# Patient Record
Sex: MALE | Race: WHITE
[De-identification: names, ages, dates, MRNs, and addresses within clinical notes are randomized per-mention and may not be internally consistent; named-entity substitution may affect disease eponyms.]

---

## 2019-07-10 ENCOUNTER — HOSPITAL ENCOUNTER (INPATIENT)
Dept: HOSPITAL 75 - ER | Age: 39
LOS: 2 days | Discharge: HOME | DRG: 854 | End: 2019-07-12
Attending: INTERNAL MEDICINE | Admitting: INTERNAL MEDICINE
Payer: SELF-PAY

## 2019-07-10 VITALS — SYSTOLIC BLOOD PRESSURE: 123 MMHG | DIASTOLIC BLOOD PRESSURE: 77 MMHG

## 2019-07-10 VITALS — SYSTOLIC BLOOD PRESSURE: 117 MMHG | DIASTOLIC BLOOD PRESSURE: 60 MMHG

## 2019-07-10 VITALS — WEIGHT: 240 LBS | BODY MASS INDEX: 29.84 KG/M2 | HEIGHT: 75 IN

## 2019-07-10 VITALS — SYSTOLIC BLOOD PRESSURE: 113 MMHG | DIASTOLIC BLOOD PRESSURE: 64 MMHG

## 2019-07-10 DIAGNOSIS — M79.7: ICD-10-CM

## 2019-07-10 DIAGNOSIS — Z23: ICD-10-CM

## 2019-07-10 DIAGNOSIS — F17.210: ICD-10-CM

## 2019-07-10 DIAGNOSIS — A41.02: Primary | ICD-10-CM

## 2019-07-10 DIAGNOSIS — L03.113: ICD-10-CM

## 2019-07-10 LAB
ALBUMIN SERPL-MCNC: 4.5 GM/DL (ref 3.2–4.5)
ALP SERPL-CCNC: 92 U/L (ref 40–136)
ALT SERPL-CCNC: 43 U/L (ref 0–55)
APTT BLD: 33 SEC (ref 24–35)
BASOPHILS # BLD AUTO: 0.1 10^3/UL (ref 0–0.1)
BASOPHILS NFR BLD AUTO: 0 % (ref 0–10)
BASOPHILS NFR BLD MANUAL: 2 %
BILIRUB SERPL-MCNC: 0.5 MG/DL (ref 0.1–1)
BUN/CREAT SERPL: 17
CALCIUM SERPL-MCNC: 9.9 MG/DL (ref 8.5–10.1)
CHLORIDE SERPL-SCNC: 101 MMOL/L (ref 98–107)
CO2 SERPL-SCNC: 24 MMOL/L (ref 21–32)
CREAT SERPL-MCNC: 1.12 MG/DL (ref 0.6–1.3)
EOSINOPHIL # BLD AUTO: 0.2 10^3/UL (ref 0–0.3)
EOSINOPHIL NFR BLD AUTO: 1 % (ref 0–10)
EOSINOPHIL NFR BLD MANUAL: 2 %
ERYTHROCYTE [DISTWIDTH] IN BLOOD BY AUTOMATED COUNT: 13.6 % (ref 10–14.5)
GFR SERPLBLD BASED ON 1.73 SQ M-ARVRAT: > 60 ML/MIN
GLUCOSE SERPL-MCNC: 99 MG/DL (ref 70–105)
HCT VFR BLD CALC: 42 % (ref 40–54)
HGB BLD-MCNC: 14 G/DL (ref 13.3–17.7)
INR PPP: 1 (ref 0.8–1.4)
LYMPHOCYTES # BLD AUTO: 4 X 10^3 (ref 1–4)
LYMPHOCYTES NFR BLD AUTO: 27 % (ref 12–44)
MANUAL DIFFERENTIAL PERFORMED BLD QL: YES
MCH RBC QN AUTO: 30 PG (ref 25–34)
MCHC RBC AUTO-ENTMCNC: 34 G/DL (ref 32–36)
MCV RBC AUTO: 88 FL (ref 80–99)
MONOCYTES # BLD AUTO: 1.5 X 10^3 (ref 0–1)
MONOCYTES NFR BLD AUTO: 10 % (ref 0–12)
MONOCYTES NFR BLD: 7 %
NEUTROPHILS # BLD AUTO: 9.1 X 10^3 (ref 1.8–7.8)
NEUTROPHILS NFR BLD AUTO: 61 % (ref 42–75)
NEUTS BAND NFR BLD MANUAL: 45 %
NEUTS BAND NFR BLD: 1 %
NEUTS HYPERSEG BLD QL SMEAR: SLIGHT
OVALOCYTES BLD QL SMEAR: SLIGHT
PLATELET # BLD: 357 10^3/UL (ref 130–400)
PMV BLD AUTO: 9.2 FL (ref 7.4–10.4)
POIKILOCYTOSIS BLD QL SMEAR: SLIGHT
POTASSIUM SERPL-SCNC: 4.2 MMOL/L (ref 3.6–5)
PROT SERPL-MCNC: 8 GM/DL (ref 6.4–8.2)
PROTHROMBIN TIME: 13.6 SEC (ref 12.2–14.7)
SODIUM SERPL-SCNC: 137 MMOL/L (ref 135–145)
STOMATOCYTES BLD QL SMEAR: SLIGHT
TOXIC GRANULES BLD QL SMEAR: (no result)
VARIANT LYMPHS NFR BLD MANUAL: 39 %
VARIANT LYMPHS NFR BLD MANUAL: 4 %
WBC # BLD AUTO: 14.8 10^3/UL (ref 4.3–11)

## 2019-07-10 PROCEDURE — 85730 THROMBOPLASTIN TIME PARTIAL: CPT

## 2019-07-10 PROCEDURE — 80053 COMPREHEN METABOLIC PANEL: CPT

## 2019-07-10 PROCEDURE — 87075 CULTR BACTERIA EXCEPT BLOOD: CPT

## 2019-07-10 PROCEDURE — 87081 CULTURE SCREEN ONLY: CPT

## 2019-07-10 PROCEDURE — 87040 BLOOD CULTURE FOR BACTERIA: CPT

## 2019-07-10 PROCEDURE — 87186 SC STD MICRODIL/AGAR DIL: CPT

## 2019-07-10 PROCEDURE — 85027 COMPLETE CBC AUTOMATED: CPT

## 2019-07-10 PROCEDURE — 85007 BL SMEAR W/DIFF WBC COUNT: CPT

## 2019-07-10 PROCEDURE — 73080 X-RAY EXAM OF ELBOW: CPT

## 2019-07-10 PROCEDURE — 87205 SMEAR GRAM STAIN: CPT

## 2019-07-10 PROCEDURE — 96375 TX/PRO/DX INJ NEW DRUG ADDON: CPT

## 2019-07-10 PROCEDURE — 87070 CULTURE OTHR SPECIMN AEROBIC: CPT

## 2019-07-10 PROCEDURE — 80048 BASIC METABOLIC PNL TOTAL CA: CPT

## 2019-07-10 PROCEDURE — 83605 ASSAY OF LACTIC ACID: CPT

## 2019-07-10 PROCEDURE — 96365 THER/PROPH/DIAG IV INF INIT: CPT

## 2019-07-10 PROCEDURE — 80202 ASSAY OF VANCOMYCIN: CPT

## 2019-07-10 PROCEDURE — 90471 IMMUNIZATION ADMIN: CPT

## 2019-07-10 PROCEDURE — 96361 HYDRATE IV INFUSION ADD-ON: CPT

## 2019-07-10 PROCEDURE — 36415 COLL VENOUS BLD VENIPUNCTURE: CPT

## 2019-07-10 PROCEDURE — 85025 COMPLETE CBC W/AUTO DIFF WBC: CPT

## 2019-07-10 PROCEDURE — 90715 TDAP VACCINE 7 YRS/> IM: CPT

## 2019-07-10 PROCEDURE — 87077 CULTURE AEROBIC IDENTIFY: CPT

## 2019-07-10 PROCEDURE — 86141 C-REACTIVE PROTEIN HS: CPT

## 2019-07-10 PROCEDURE — 85610 PROTHROMBIN TIME: CPT

## 2019-07-10 PROCEDURE — 76999 ECHO EXAMINATION PROCEDURE: CPT

## 2019-07-10 NOTE — NUR
PATIENT STATES HE HAS BEEN TAKING AND OTC ASPIRIN 81MG DAILY. HE DOES NOT TAKE ANY 
PRESCRIPTION MEDICATION OR ANYTHING ELSE OTC.

## 2019-07-10 NOTE — XMS REPORT
[facility]

                             Created on: 2019



Kuldeep Skaggs

External Reference #: [id number]

: 1980

Sex: Male



Demographics







                          Address                   [address] [place]  [address]

 

                          Preferred Language        Unknown

 

                          Marital Status            Unknown

 

                          Druze Affiliation     Unknown

 

                          Race                      Unknown

 

                          Ethnic Group              Unknown





Author







                          Author                    Migration,  Doctor

 

                          Organization              [facility]

 

                          Address                   Unknown

 

                          Phone                     Unavailable







Care Team Providers







                    Care Team Member Name    Role                Phone

 

                    Migration,  Doctor    Unavailable         Unavailable







PROBLEMS







          Type      Condition    ICD9-CM Code    IXG68-HI Code    Onset Dates    Condition Status    SNOMED

 Code

 

          Problem    Pain in soft tissues of limb    729.5                         Active    [id number]

 

          Problem    Pain in joint, lower leg    719.46                        Active    [id number]

 

                Problem         Other, multiple and ill-defined closed fractures of lower limb    827.0           

                                        Active              [id number]







ALLERGIES







             Substance    Reaction     Event Type    Date         Status

 

             Demerol      Unknown      Drug Allergy        Active







ENCOUNTERS







                Encounter       Location        Date            Diagnosis

 

                          [facility]     [address] [place] [address]

                                         

 

                          [facility]     [id number] [place] [address]

                                         

 

                          [facility]     [id number] [place] [address]

                                         

 

                          [facility]     [id number] [place] [address]

                                         

 

                          [facility]     [id number] [place] [address]

                                         

 

                          [facility]     [id number] [place] [address]

                                         

 

                          [facility]     [id number] [place] [address]

                          10 Apr, 2014               

 

                          [facility]     [id number] [place] [address]

                          10 Apr, 2014               

 

                          [facility]     [address] [place] [address]

                                         

 

                          [facility]     [id number] [place] [address]

                                         

 

                          [facility] [place] [facility]     [address] [id number] [place] [address]

                                         

 

                          [facility] [place] [facility]     [address] [id number] [place] [address]

                                         

 

                          [facility] [place] [facility]     [address] [id number] [place] [address]

                          19 Mar, 2014               

 

                          [facility] [place] [facility]     [address] [id number] [place] [address]

                          19 Mar, 2014               

 

                          [facility] [place] [facility]     [address] [id number] [place] [address]

                          05 Dec, 2013               

 

                          [facility] [place] [facility]     [address] [id number] [place] [address]

                          05 Dec, 2013               

 

                          [facility] [place] [facility]     [address] [id number] [place] [address]

                          15 Nov, 2013               

 

                          [facility] [place] [facility]     [address] [id number] [place] [address]

                          15 Nov, 2013               

 

                          [facility] [place] [facility]     [address] [id number] [place] [address]

                          18 Oct, 2013               

 

                          [facility] [place] [facility]     [address] [id number] [place] [address]

                          18 Oct, 2013               

 

                          [facility] [place] [facility]     [address] [id number] [place] [address]

                          04 Oct, 2013               

 

                          [facility] [place] [facility]     [address] [id number] [place] [address]

                          03 Oct, 2013               

 

                          [facility] [place] [facility]     [address] [id number] [place] [address]

                          28 Aug, 2013               

 

                          [facility] [place] [facility]     [address] [id number] [place] [address]

                          26 Aug, 2013               

 

                          [facility] [place] [facility]     [address] [id number] [place] [address]

                                         

 

                          [facility] [place] [facility]     [address] [address]

                                         

 

                          [facility]     [address] [address]

                          20 May, 2013               

 

                          [facility]     [address] [address]

                          07 May, 2013               

 

                          [facility]     [address] [address]

                                         

 

                          [facility]     [address] [address]

                                         

 

                          [facility]     [address] [address]

                                         

 

                          [facility]     [address] [address]

                                         

 

                          [facility]     [address] [address]

                                         

 

                          [facility]     [address] [address]

                                         

 

                          [facility]     [id number] [address]

                                         







IMMUNIZATIONS

No Known Immunizations



SOCIAL HISTORY

Never Assessed



REASON FOR VISIT

[facility]



PLAN OF CARE





VITAL SIGNS





MEDICATIONS

Unknown Medications



RESULTS

No Results



PROCEDURES

No Known procedures



INSTRUCTIONS





MEDICATIONS ADMINISTERED

No Known Medications

## 2019-07-10 NOTE — DIAGNOSTIC IMAGING REPORT
INDICATION: Recent injury to the right elbow, now with redness

and swelling as well as fever.



Time of exam: 9:12 AM



3 views of the right elbow were obtained. There is moderate soft

tissue swelling about the posterior elbow. No soft tissue gas is

seen. No bony destructive changes are seen. No definite elbow

joint effusion is identified. No fracture or dislocation is seen.



IMPRESSION: Moderate posterior soft tissue swelling. No other

significant abnormality is seen.



Dictated by: 



  Dictated on workstation # [id number]

## 2019-07-10 NOTE — ED UPPER EXTREMITY
General


Chief Complaint:  Upper Extremity


Stated Complaint:  ELBOW INJ


Nursing Triage Note:  


PT AMB TO [facility] WITH COMPLAINT OF RIGHT ELBOW PAIN, SWELLING, AND REDNESS. 


STATES HE WAS BIT BY SOMETHING IN THE LAST FEW DAYS. STATES REDNESS HAS 


WORSENED. STATES LANCED AREA AT HOME, DID NOT SQUEEZE ANYTHING OUT.


Nursing Sepsis Screen:  No Definite Risk


Source:  patient


Exam Limitations:  no limitations





History of Present Illness


Date Seen by Provider:  Jul 10, 2019


Time Seen by Provider:  08:22


Initial Comments


Here with report of right elbow pain and swelling as well as redness that is 

increasing over the last 12 hours. He thinks he got bit by something on the 

injured elbow a few days ago. Noted swelling to the area. Tried to heidy it with

a needle last night and he did not get any purulent drainage. Redness increased.

He did luisa the wound edges. Redness does appear to be slightly outside of the 

border currently. Does have fever as tachycardic. He did have to ride a bike 

here today. States the redness and pain worsens during the bike ride. He has not

had anything for pain or fever today.


Onset:  other (2 days ago)


Severity:  moderate


Pain/Injury Location:  right arm, right elbow


Method of Injury:  unknown


Modifying Factors:  Improves With Immobilization; Worse With Movement; Improves 

With Rest





Allergies and Home Medications


Allergies


Coded Allergies:  


     meperidine (Unverified  Allergy, Unknown, PT HAS RECEIVED FENTANYL IN THE 

PAST W/O ISSUE, 2/16/15)





Home Medications


Naproxen 500 Mg Tablet, 500 MG PO BID, (Reported)


Oxycodone Hcl/Acetaminophen 1 Tab Tablet, 1-2 TAB PO Q4-6HR PRN for PAIN


   MAY TAKE 1 OR 2 TABS BY MOUTH EVERY 4 HRS AS NEEDED FOR PAIN. LAST DOSE, 2 

TABS, GIVEN AT 4:25 PM 2/16/15. DO NOT EXCEED 4000 MG TYLENOL IN A 24 HR PERIOD.




   Prescribed by: NICOLAS BRYAN on 2/16/15 6084





Patient Home Medication List


Home Medication List Reviewed:  Yes





Review of Systems


Constitutional:  see HPI; No chills, No fever


EENTM:  no symptoms reported


Respiratory:  no symptoms reported


Cardiovascular:  no symptoms reported


Gastrointestinal:  no symptoms reported


Musculoskeletal:  see HPI, joint pain, joint swelling, muscle pain


Skin:  change in color, lesions


Psychiatric/Neurological:  No Symptoms Reported





All Other Systems Reviewed


Negative Unless Noted:  Yes





Past Medical-Social-Family Hx


Past Med/Social Hx:  Reviewed Nursing Past Med/Soc Hx


Patient Social History


Alcohol Use:  Denies Use


Recreational Drug Use:  No


Smoking Status:  Current Everyday Smoker


Type Used:  Cigarettes


Recent Foreign Travel:  No


Contact w/Someone Who Travel:  No


Recent Infectious Disease Expo:  No





Past Medical History


Surgeries:  Yes


Appendectomy, Orthopedic


Respiratory:  No


Cardiac:  No


Neurological:  No


Reproductive Disorders:  No


Sexually Transmitted Disease:  No


Gastrointestinal:  No


Musculoskeletal:  Yes (RIGHT ANKLE)


Fibromyalgia


Endocrine:  No


Cancer:  No


Psychosocial:  No


Integumentary:  No


Blood Disorders:  No





Family Medical History


Reviewed Nursing Family Hx





Physical Exam


Vital Signs





Vital Signs - First Documented








 7/10/19





 08:09


 


Temp 100.2


 


Pulse 113


 


Resp 20


 


B/P (MAP) 140/91 (107)


 


Pulse Ox 97


 


O2 Delivery Room Air





Capillary Refill : Less Than 3 Seconds


Height, Weight, BMI


Height: 6'3.00"


Weight: 240lbs. oz. 108.624463fe;  BMI


Method:Stated


General Appearance:  WD/WN, no apparent distress


HEENT:  PERRL/EOMI, pharynx normal


Neck:  full range of motion, supple


Cardiovascular:  no murmur, tachycardia


Respiratory:  lungs clear, normal breath sounds, no respiratory distress


Gastrointestinal:  non tender, soft


Back:  normal inspection, no CVA tenderness, no vertebral tenderness


Shoulder:  normal inspection, normal ROM


Elbow/Forearm:  normal ROM, Right, pain (around the elbow both above and below 

where there is noted to be swelling and erythema), soft tissue tenderness, 

swelling (involving the right arm. Greatest around the right elbow. There is an 

indurated/tight spot to the elbow prominence with small wound centrally in the 

area that is 3-4 mm.)


Wrist:  Yes normal inspection, Yes no evidence of injury, Yes normal ROM


Hand:  normal inspection, non-tender, normal ROM, Bilateral


Neurologic/Psychiatric:  alert, oriented x 3


Skin:  warm/dry, other (erythema noted to right arm from mid biceps down to mid 

forearm circumferentially. Swelling noted in the same area.)





Progress/Results/Core Measures


Results/Orders


Lab Results





Laboratory Tests








Test


 7/10/19


08:49 Range/Units


 


 


White Blood Count


 14.8 H


 4.3-11.0


10^3/uL


 


Red Blood Count


 4.75 


 4.35-5.85


10^6/uL


 


Hemoglobin 14.0  13.3-17.7  G/DL


 


Hematocrit 42  40-54  %


 


Mean Corpuscular Volume 88  80-99  FL


 


Mean Corpuscular Hemoglobin 30  25-34  PG


 


Mean Corpuscular Hemoglobin


Concent 34 


 32-36  G/DL





 


Red Cell Distribution Width 13.6  10.0-14.5  %


 


Platelet Count


 357 


 130-400


10^3/uL


 


Mean Platelet Volume 9.2  7.4-10.4  FL


 


Neutrophils (%) (Auto) 61  42-75  %


 


Lymphocytes (%) (Auto) 27  12-44  %


 


Monocytes (%) (Auto) 10  0-12  %


 


Eosinophils (%) (Auto) 1  0-10  %


 


Basophils (%) (Auto) 0  0-10  %


 


Neutrophils # (Auto) 9.1 H 1.8-7.8  X 10^3


 


Lymphocytes # (Auto) 4.0  1.0-4.0  X 10^3


 


Monocytes # (Auto) 1.5 H 0.0-1.0  X 10^3


 


Eosinophils # (Auto)


 0.2 


 0.0-0.3


10^3/uL


 


Basophils # (Auto)


 0.1 


 0.0-0.1


10^3/uL


 


Neutrophils % (Manual) 45   %


 


Lymphocytes % (Manual) 39   %


 


Monocytes % (Manual) 7   %


 


Eosinophils % (Manual) 2   %


 


Basophils % (Manual) 2   %


 


Band Neutrophils 1   %


 


Hypersegmented Neutrophils SLIGHT   


 


Reactive Lymphocytes 4   %


 


Toxic Granulation 1+   


 


Poikilocytosis SLIGHT   


 


Stomatocytes SLIGHT   


 


Elliptocytes SLIGHT   


 


Prothrombin Time 13.6  12.2-14.7  SEC


 


INR Comment 1.0  0.8-1.4  


 


Activated Partial


Thromboplast Time 33 


 24-35  SEC





 


Sodium Level 137  135-145  MMOL/L


 


Potassium Level 4.2  3.6-5.0  MMOL/L


 


Chloride Level 101    MMOL/L


 


Carbon Dioxide Level 24  21-32  MMOL/L


 


Anion Gap 12  5-14  MMOL/L


 


Blood Urea Nitrogen 19 H 7-18  MG/DL


 


Creatinine


 1.12 


 0.60-1.30


MG/DL


 


Estimat Glomerular Filtration


Rate > 60 


  





 


BUN/Creatinine Ratio 17   


 


Glucose Level 99    MG/DL


 


Lactic Acid Level


 0.79 


 0.50-2.00


MMOL/L


 


Calcium Level 9.9  8.5-10.1  MG/DL


 


Corrected Calcium 9.5  8.5-10.1  MG/DL


 


Total Bilirubin 0.5  0.1-1.0  MG/DL


 


Aspartate Amino Transf


(AST/SGOT) 20 


 5-34  U/L





 


Alanine Aminotransferase


(ALT/SGPT) 43 


 0-55  U/L





 


Alkaline Phosphatase 92    U/L


 


C-Reactive Protein High


Sensitivity 10.33 H


 0.00-0.50


MG/DL


 


Total Protein 8.0  6.4-8.2  GM/DL


 


Albumin 4.5  3.2-4.5  GM/DL








My Orders





Orders - TIMO MCGUIRE MD


Cbc With Automated Diff (7/10/19 08:32)


Comprehensive Metabolic Panel (7/10/19 08:32)


Blood Culture (7/10/19 08:32)


Sputum Culture (7/10/19 08:32)


Protime With Inr (7/10/19 08:32)


Partial Thromboplastin Time (7/10/19 08:32)


Acetaminophen  Tablet (Tylenol  Tablet) (7/10/19 08:45)


Ed Iv/Invasive Line Start (7/10/19 08:32)


Vital Signs Adult Sepsis Patie Q15M (7/10/19 08:32)


O2 (7/10/19 08:32)


Remove Rings In Anticipation O (7/10/19 08:32)


Lactic Acid Analyzer (7/10/19 08:32)


Ns Iv 1000 Ml (Sodium Chloride 0.9%) (7/10/19 08:32)


Hs C Reactive Protein (7/10/19 08:32)


Elbow, Right, 3 Views (7/10/19 08:32)


Ketorolac Injection (Toradol Injection) (7/10/19 08:32)


Manual Differential (7/10/19 08:49)


Ceftriaxone  For Iv Use (Rocephin  For I (7/10/19 10:15)


Tdap (Boostrix) Im (7/10/19 10:30)





Medications Given in ED





Current Medications








 Medications  Dose


 Ordered  Sig/Ugo


 Route  Start Time


 Stop Time Status Last Admin


Dose Admin


 


 Acetaminophen  1,000 mg  ONCE  PRN


 PO  7/10/19 08:45


 7/10/19 08:56 DC 7/10/19 08:55


1,000 MG








Vital Signs/I&O











 7/10/19





 08:09


 


Temp 100.2


 


Pulse 113


 


Resp 20


 


B/P (MAP) 140/91 (107)


 


Pulse Ox 97


 


O2 Delivery Room Air














Blood Pressure Mean:                    107











Progress


Progress Note :  


Progress Note


Seen and evaluated. IV, labs, blood cultures and lactic acid ordered. Normal 

saline 1 L bolus, Toradol 30 mg IV and Tylenol 1 g by mouth. Patient does have 

findings for sepsis including tachycardia and febrile with concerning area of 

infection. Monitor patient. 1017: Labs reviewed. Patient noted to have 

cellulitis and findings of systemic inflammation with elevated CRP and white 

count. Rocephin 1 g IV ordered. Tetanus updated. I discussed the case with Dr. Gentile at 1015 and she accepts patient for admission. I did discuss the case 

with Dr. Wilson at 1017 and he accepts patient in consult. He requests 

ultrasound of the elbow which was ordered. We will initiate Rocephin and 

vancomycin orders. Discussed with patient and family who agree. Admit, inpatient

status.





Diagnostic Imaging





   Diagonstic Imaging:  Xray


   Plain Films/CT/US/NM/MRI:  elbow


Comments


                 [facility] [place], [place]





NAME:   KAILYN GANNON


[facility] REC#:   [id number]


ACCOUNT#:   [id number]


PT STATUS:   REG ER


:   1980


PHYSICIAN:   TIMO MCGUIRE MD


ADMIT DATE:   07/10/19/ER


                                   ***Draft***


Date of Exam:07/10/19





ELBOW, RIGHT, 3 VIEWS








INDICATION: Recent injury to the right elbow, now with redness


and swelling as well as fever.





Time of exam: 9:12 AM





3 views of the right elbow were obtained. There is moderate soft


tissue swelling about the posterior elbow. No soft tissue gas is


seen. No bony destructive changes are seen. No definite elbow


joint effusion is identified. No fracture or dislocation is seen.





IMPRESSION: Moderate posterior soft tissue swelling. No other


significant abnormality is seen.





  Dictated on workstation # [id number]








Dict:   07/10/19 0916


Trans:   07/10/19 0921


RADHA [id number]





Interpreted by:     KENDALL SANCHEZ MD


Electronically signed by:





Departure


Communication (Admissions)


Time/Spoke to Admitting Phy:  10:15


Time/Spoke to Consulting Phy:  10:17





Impression





   Primary Impression:  


   Right arm cellulitis


Disposition:   ADMITTED AS INPATIENT


Condition:  Stable





Admissions


Decision to Admit Reason:  Admit from ER (General)


Decision to Admit/Date:  Jul 10, 2019


Time/Decision to Admit Time:  10:15











TIMO MCGUIRE MD          Jul 10, 2019 08:45

## 2019-07-10 NOTE — XMS REPORT
Continuity of Care Document

                             Created on: 07/10/2019



KAILYN GANNON

External Reference #: [id number]

: 1980

Sex: Male



Demographics







                          Address                   [address] [place]  [address]

 

                          Home Phone                [phone] x

 

                          Preferred Language        Unknown

 

                          Marital Status            Unknown

 

                          Jew Affiliation     Unknown

 

                          Race                      Unknown

 

                          Ethnic Group              Unknown





Author







                          Organization              Unknown

 

                          Address                   Unknown

 

                          Phone                     [phone]



              



Allergies

      





             Active              Description              Code              Type              Severity

                Reaction              Onset              Reported/Identified              Relationship

 to Patient                             Clinical Status        

 

             Yes              Demerol                             Drug Allergy                       

                                           2013                                      

 

             Yes              Demerol                             Drug Allergy              N/A      

             N/A                             2013                                      

 

                Yes              meperidine              [id number]              Drug Allergy         

             Unknown              N/A                             2014                       

                                                 

 

                Yes              meperidine              [id number]              Drug Allergy         

                Unknown              PT HAS RECEIVED                              2015         

                                                             



                        



Medications

      



There is no data.                  



Problems

      





             Date Dx Coded              Attending              Type              Code              Diagnosis

                                        Diagnosed By        

 

             2011                                            316              [employer]

 WITH DISEASES CLASSIFIED ELSEWHERE                       

 

             2011                                            316              [employer]

 WITH DISEASES CLASSIFIED ELSEWHERE                       

 

             2011                                            316              [employer]

 WITH DISEASES CLASSIFIED ELSEWHERE                       

 

             2011              CHEYENNE LOWERY DO                             316              [employer]

 [employer] WITH DISEASES CLASSIFIED ELSEWHERE                       

 

                2011              NAHID DOTY MD                              316           

                          [employer] WITH DISEASES CLASSIFIED ELSEWHERE                     

  

 

             2011              CHEYENNE LOWERY DO                             316              [employer]

 [employer] WITH DISEASES CLASSIFIED ELSEWHERE                       

 

             2011                                            316              [employer]

 WITH DISEASES CLASSIFIED ELSEWHERE                       

 

             2011              RONNY EISENBERG MD                             316              [employer]

 [employer] WITH DISEASES CLASSIFIED ELSEWHERE                       

 

             2013                                            719.46              PAIN IN JOINT 

INVOLVING LOWER LEG                              

 

             2013                                            719.46              joint pain in 

the left knee                                    

 

             2013                                            719.46              joint pain in 

the left knee                                    

 

                2013              CHEYENNE LOWERY DO                              719.46           

                          joint pain in the left knee                       

 

                2013              NAHID DOTY MD                              719.46        

                          joint pain in the left knee                       

 

                2013              CHEYENNE LOWERY DO                              719.46           

                          joint pain in the left knee                       

 

                2013              RONNY EISENBERG MD                              719.46          

                          joint pain in the left knee                       

 

             04/10/2014              CHEYENNE LOWERY DO                             729.5              

PAIN IN LIMB                                     

 

                04/10/2014              RONNY EISENBERG MD                              729.5           

                          PAIN IN LIMB                       

 

             2014              IVORY MCQUEEN DO              Ot              844.9              

                                                 

 

             2014              IVORY MCQUEEN DO              Ot              959.7              

                                                 

 

                2014              IVORY MCQUEEN DO              Ot              E000.8           

                                                             

 

                2014              IVORY MCQUEEN DO              Ot              E849.0           

                                                             

 

                2014              IVORY MCQUEEN DO              Ot              E927.0           

                                                             

 

                2015              FAINA TAYLOR, RONNY                              827.0           

                          OTHER MULTIPLE AND ILL-DEFINED FRACTURES OF LOWER LIMB CLOSED                     

  

 

           2015                             Ot              724.5                              

        

 

                2015              TORRI CHUA MD              Ot              719.46   

                                                             

 

                2015              TORRI CHUA MD              Ot              719.46   

                                                             

 

                2015              JOSEMANUEL TAYLOR, ЕЛЕНА LIZAMA              Ot              823.01         

                                                             

 

                2015              ЕЛЕНА ABERNATHY MD              Ot              824.0          

                                                             

 

                2015              ЕЛЕНА ABERNATHY MD              Ot              845.00         

                                                             

 

                2015              ЕЛЕНА ABERNATHY MD              Ot              E000.8         

                                                             

 

                2015              ЕЛЕНА ABERNATHY MD              Ot              E849.6         

                                                             

 

                2015              ЕЛЕНА ABERNATHY MD              Ot              E888.9         

                                                             



                                                                                



Procedures

      





                Code              Description              Performed By              Performed On     

   

 

                                      80031                                    XRAY KNEE LEFT, 1 OR 2 VIEWS 

                                                    2013        

 

                                      ORTHO                                    THANH GIBSON                  

                                                    2013        

 

                                      [id number]                                    ROUTINE VENIPUNCTURE         

                                                    2013        

 

                                      13877                                    MRI EXTREMITY JOINT, LOWER LEFT,

 W/O CONTRAST                                                  2013        

 

                                      11269                                    US LOWER EXTREMITY ULTRASOUND

                                                    2013        

 

                                21937                                    CMP                            

                                        2013        

 

                                [id number]                                    CBC                            

                                        2013        

 

                                      Orthopedi                                    Thanh Gibson              

                                                    2013        

 

                                      51762                                    JOINT INJECTION- LARGE JOINT 

(SPECIFY MEDCIN DESCRIPTION)                                                  10/03/2013        



 

                                      86085                                    XRAY FOOT RIGHT COMP MIN 3 VIEWS

                                                    04/10/2014        

 

                                      78872                                    XRAY TIBIA/FIBULA RIGHT      

                                                    2015        



                                      



Results

      



There is no data.              



Encounters

      





                ACCT No.              Visit Date/Time              Discharge              Status      

                Pt. Type              Provider              Facility              Loc./Unit      

                                        Complaint        

 

                [id number]              2015 11:47:00              2015 23:59:59              

[facility]              Outpatient              RONNY EISENBERG MD                                

                                                 

 

                [id number]              04/10/2014 13:42:00              04/10/2014 23:59:59              

[facility]              Outpatient              BEVERLEY GRANADO CHEYENNE EVERTON                                 

                                                 

 

                [id number]              2014 10:52:00              2014 23:59:59              

[facility]              Outpatient              NAHID DOTY MD                              

                                                 

 

                [id number]              10/03/2013 15:12:00              10/03/2013 23:59:59              

[facility]              Outpatient              CHEYENNE LOWERY DO                                 

                                                 

 

                [id number]              2013 09:08:00              2013 23:59:59              

[facility]              Outpatient                                                                

    

 

                [id number]              2012 12:30:00              2012 23:59:59              [facility]

              Outpatient                                                                   

 

 

             [id number]              2013 14:29:00                                            Document

 Registration                                                                       

 

             [id number]              2013 15:16:00                                            Document

 Registration                                                                       

 

                    [id number]              2015 09:30:00              2015 17:50:00      

                DIS              Outpatient              ЕЛЕНА ABERNATHY MD              [facility]                                

 

                    [id number]              2015 13:35:00              2015 23:59:59      

                [facility]              Outpatient              ЕЛЕНА ABERNATHY MD              [facility]              PREOP                              

 

                    [id number]              2014 17:23:00              2014 18:35:00      

                DIS              Emergency              IVORY MCQUEEN DO              [facility]              ER                                 

 

                    [id number]              2013 11:58:00              2013 23:59:59      

                [facility]              Outpatient              TORRI CHUA MD              [facility]              RAD                                

 

                    [id number]              2013 09:51:00              2013 23:59:59      

                [facility]              Outpatient              TORRI CHUA MD              [facility]              RAD                                

 

                [id number]              2010 12:46:00                                          

             Document Registration

## 2019-07-10 NOTE — NUR
KAILYN GANNON admitted to room 410-1, with an admitting diagnosis of RIGHT ARM CELLULITIS, 
on 07/10/19 from ED via W/C, accompanied by ED STAFF.  KAILYN GANNON introduced to 
surroundings, call light, bed controls, phone, TV, temperature control, lights, meal times, 
smoking policy, visitor policy, side rail policy, bathrooms and showers.  Patient Rights 
given to patient in the handbook.  KAILYN GANNON verbalizes understanding that [facility] 
is not responsible for the loss or damage to any personal effects or valuables that are kept 
in the patients possession during their hospitalization.

## 2019-07-10 NOTE — XMS REPORT
[facility]

                             Created on: 2019



Kuldeep Skaggs

External Reference #: [id number]

: 1980

Sex: Male



Demographics







                          Address                   [address] [place]  [address]

 

                          Preferred Language        Unknown

 

                          Marital Status            Unknown

 

                          Orthodoxy Affiliation     Unknown

 

                          Race                      Unknown

 

                          Ethnic Group              Unknown





Author







                          Author                    Migration,  Doctor

 

                          Organization              [facility]

 

                          Address                   Unknown

 

                          Phone                     Unavailable







Care Team Providers







                    Care Team Member Name    Role                Phone

 

                    Migration,  Doctor    Unavailable         Unavailable







PROBLEMS







          Type      Condition    ICD9-CM Code    JVN08-ZN Code    Onset Dates    Condition Status    SNOMED

 Code

 

          Problem    Pain in soft tissues of limb    729.5                         Active    [id number]

 

          Problem    Pain in joint, lower leg    719.46                        Active    [id number]

 

                Problem         Other, multiple and ill-defined closed fractures of lower limb    827.0           

                                        Active              [id number]







ALLERGIES

No Information



ENCOUNTERS







                Encounter       Location        Date            Diagnosis

 

                          [facility]     [address] [place] [address]

                                         

 

                          [facility]     [id number] [place] [address]

                                         

 

                          [facility]     [id number] [place] [address]

                                         

 

                          [facility]     [id number] [place] [address]

                                         

 

                          [facility]     [id number] [place] [address]

                                         

 

                          [facility]     [address] [place] [address]

                                         

 

                          [facility]     [id number] [place] [address]

                          10 Apr, 2014               

 

                          [facility]     [id number] [place] [address]

                          10 Apr, 2014               

 

                          [facility]     [id number] [place] [address]

                                         

 

                          [facility]     [address] [place] [address]

                                         

 

                          [facility] [place] [facility]     [address] [id number] [place] [address]

                                         

 

                          [facility] [place] [facility]     [address] [id number] [place] [address]

                                         

 

                          [facility] [place] [facility]     [address] [id number] [place] [address]

                          19 Mar, 2014               

 

                          [facility] [place] [facility]     [address] [id number] [place] [address]

                          19 Mar, 2014               

 

                          [facility] [place] [facility]     [address] [id number] [place] [address]

                          05 Dec, 2013               

 

                          [facility] [place] [facility]     [address] [id number] [place] [address]

                          05 Dec, 2013               

 

                          [facility] [place] [facility]     [address] [id number] [place] [address]

                          15 Nov, 2013               

 

                          [facility] [place] [facility]     [address] [id number] [place] [address]

                          15 Nov, 2013               

 

                          [facility] [place] [facility]     [address] [id number] [place] [address]

                          18 Oct, 2013               

 

                          [facility] [place] [facility]     [address] [id number] [place] [address]

                          18 Oct, 2013               

 

                          [facility] [place] [facility]     [address] [id number] [place] [address]

                          04 Oct, 2013               

 

                          [facility] [place] [facility]     [address] [id number] [place] [address]

                          03 Oct, 2013               

 

                          [facility] [place] [facility]     [address] [id number] [place] [address]

                          28 Aug, 2013               

 

                          [facility] [place] [facility]     [address] [id number] [place] [address]

                          26 Aug, 2013               

 

                          [facility] [place] [facility]     [address] [id number] [place] [address]

                                         

 

                          [facility] [place] [facility]     [address] [id number] [address]

                                         

 

                          [facility]     [id number] [address]

                          20 May, 2013               

 

                          [facility]     [address] [address]

                          07 May, 2013               

 

                          [facility]     [address] [address]

                                         

 

                          [facility]     [address] [place] [address]

                                         

 

                          [facility]     [id number] [address]

                                         

 

                          [facility]     [address] [place] [address]

                                         

 

                          [facility]     [id number] [address]

                                         

 

                          [facility]     [id number] [address]

                                         

 

                          [facility]     [id number] [address]

                                         







IMMUNIZATIONS

No Known Immunizations



SOCIAL HISTORY

Never Assessed



RE[place] VISIT

[facility] [place]





VITAL SIGNS





MEDICATIONS

Unknown Medications



RESULTS

No Results



PROCEDURES

No Known procedures



INSTRUCTIONS





MEDICATIONS ADMINISTERED

No Known Medications

## 2019-07-10 NOTE — CONSULTATION - SURGERY
History of Present Illness


History of Present Illness


Patient Consulted On(sarahy/time)


7/10/19


 16:57


Time Seen by Provider:  14:36


History of Present Illness


Surgery asked to consult regarding Right Upper extremity cellulitis possible 

abscess (Elbow)





HPI per ED:  Here with report of right elbow pain and swelling as well as 

redness that is increasing over the last 12 hours. He thinks he got bit by 

something on the injured elbow a few days ago. Noted swelling to the area. Tried

to heidy it with a needle last night and he did not get any purulent drainage. 

Redness increased. He did luisa the wound edges. Redness does appear to be 

slightly outside of the border currently. Does have fever as tachycardic. He did

have to ride a bike here today. States the redness and pain worsens during the 

bike ride. He has not had anything for pain or fever today.


Onset:  other (2 days ago)


Severity:  moderate


Pain/Injury Location:  right arm, right elbow


Method of Injury:  unknown


Modifying Factors:  Improves With Immobilization; Worse With Movement; Improves 

With Rest








When I spoke to pt he was getting his US and he thought the swelling and pain 

were a little better.  He rated the pain as 6 out of 10, described as dull, 

achey and constant.  His girlfriend states she has had 2 episodes like this and 

she was told she had MRSA.





Allergies and Home Medications


Allergies


Coded Allergies:  


     meperidine (Unverified  Allergy, Unknown, PT HAS RECEIVED FENTANYL IN THE 

PAST W/O ISSUE, 2/16/15)





Home Medications


Aspirin 81 Mg Tablet.dr, 81 MG PO DAILY, (Reported)





Patient Home Medication List


Home Medication List Reviewed:  Yes





Past Medical-Social-Family Hx


Patient Social History


Alcohol Use:  Denies Use


Recreational Drug Use:  No


Smoking Status:  Current Everyday Smoker


Type Used:  Cigarettes


Recent Foreign Travel:  No


Contact w/Someone Who Travel:  No


Recent Infectious Disease Expo:  No





Surgeries


History of Surgeries:  Yes


Surgeries:  Appendectomy, Orthopedic





Respiratory


History of Respiratory Disorde:  No





Cardiovascular


History of Cardiac Disorders:  No





Neurological


History of Neurological Disord:  No





Reproductive System


Hx Reproductive Disorders:  No


Sexually Transmitted Disease:  No





Gastrointestinal


History of Gastrointestinal Di:  No





Musculoskeletal


History of Musculoskeletal Dis:  Yes (RIGHT ANKLE)


Musculoskeletal Disorders:  Fibromyalgia





Endocrine


History of Endocrine Disorders:  No





Cancer


History of Cancer:  No





Psychosocial


History of Psychiatric Problem:  No





Integumentary


History of Skin or Integumenta:  No





Blood Transfusions


History of Blood Disorders:  No





Family Medical History


Significant Family History:  Other Conditions/Hx (Pt states his parents have no 

medical problems; denied DM, HTN or CAD)





Review of Systems-General


Constitutional:  chills; No diaphoresis, No weight loss


EENTM:  No blurred vision, No double vision, No mouth pain, No mouth swelling, 

No nose congestion


Respiratory:  No cough, No dyspnea on exertion


Cardiovascular:  No chest pain, No edema, No palpitations


Gastrointestinal:  No abdominal pain, No nausea, No vomiting


Genitourinary:  No dysuria, No frequency, No hematuria


Musculoskeletal:  joint pain, joint swelling, muscle pain, muscle stiffness, 

muscle cramps


Skin:  see HPI


Psychiatric/Neurological:  Denies Anxiety, Denies Depressed, Denies Seizure, 

Denies Tremors


Other


pt denies hx or abnromal bleeding or bruising





Physical Exam-General Problems


Physical Exam


Vital Signs





Vital Signs - First Documented








 7/10/19





 08:09


 


Temp 100.2


 


Pulse 113


 


Resp 20


 


B/P (MAP) 140/91 (107)


 


Pulse Ox 97


 


O2 Delivery Room Air





Capillary Refill : Less Than 3 Seconds


General Appearance:  WD/WN, mild distress


Eyes:  Bilateral Eye PERRL, Bilateral Eye EOMI


HEENT:  pharynx normal; No scleral icterus (R), No scleral icterus (L)


Neck:  non-tender, full range of motion, supple, normal inspection


Respiratory:  chest non-tender, lungs clear, normal breath sounds, no 

respiratory distress, no accessory muscle use


Cardiovascular:  regular rate, rhythm, no edema, no gallop, no JVD, no murmur


Gastrointestinal:  normal bowel sounds, non tender, soft, no organomegaly, no 

pulsatile mass


Back:  no CVA tenderness, no vertebral tenderness


Extremities:  no pedal edema, no calf tenderness, normal capillary refill, other

(right arm is swollen from about mid-bicep down to hand, mildly erythematous in 

this area, appx 3-4 cm area of brighter red around the elbow, tender to touch 

and warm)


Neurologic/Psychiatric:  CNs II-XII nml as tested, no motor/sensory deficits, 

alert, normal mood/affect, oriented x 3


Skin:  normal color, warm/dry


Lymphatic:  no adenopathy (neck, axilla or groin)





Data Review


Labs


Laboratory Tests


7/10/19 08:49: 


White Blood Count 14.8H, Red Blood Count 4.75, Hemoglobin 14.0, Hematocrit 42, 

Mean Corpuscular Volume 88, Mean Corpuscular Hemoglobin 30, Mean Corpuscular 

Hemoglobin Concent 34, Red Cell Distribution Width 13.6, Platelet Count 357, 

Mean Platelet Volume 9.2, Neutrophils (%) (Auto) 61, Lymphocytes (%) (Auto) 27, 

Monocytes (%) (Auto) 10, Eosinophils (%) (Auto) 1, Basophils (%) (Auto) 0, 

Neutrophils # (Auto) 9.1H, Lymphocytes # (Auto) 4.0, Monocytes # (Auto) 1.5H, 

Eosinophils # (Auto) 0.2, Basophils # (Auto) 0.1, Neutrophils % (Manual) 45, 

Lymphocytes % (Manual) 39, Monocytes % (Manual) 7, Eosinophils % (Manual) 2, 

Basophils % (Manual) 2, Band Neutrophils 1, Hypersegmented Neutrophils SLIGHT, 

Reactive Lymphocytes 4, Toxic Granulation 1+, Poikilocytosis SLIGHT, 

Stomatocytes SLIGHT, Elliptocytes SLIGHT, Prothrombin Time 13.6, INR Comment 

1.0, Activated Partial Thromboplast Time 33, Sodium Level 137, Potassium Level 

4.2, Chloride Level 101, Carbon Dioxide Level 24, Anion Gap 12, Blood Urea 

Nitrogen 19H, Creatinine 1.12, Estimat Glomerular Filtration Rate > 60, 

BUN/Creatinine Ratio 17, Glucose Level 99, Lactic Acid Level 0.79, Calcium Level

9.9, Corrected Calcium 9.5, Total Bilirubin 0.5, Aspartate Amino Transf (AST/SG

OT) 20, Alanine Aminotransferase (ALT/SGPT) 43, Alkaline Phosphatase 92, C-

Reactive Protein High Sensitivity 10.33H, Total Protein 8.0, Albumin 4.5





Assessment/Plan


Right Arm Cellulitis and Abscess at Elbow





I was there at bedside when US tech looked for abscess; found an appx 1cm colle

ction of fluid about 1cm deep.  Surrounding this area was a lot of 

edema/cellulitis seen with US.  Plan is NPO after midnight, IV ABX,


pain control and will go to the OR for I&D with possible packing of right arm 

abscess.  Will also order consent for the procedure.  I discussed risks and 

benefits of procedure with the pt; including but not limited to pain,


bleeding, infection, scar and need for further procedure.  All questions 

answered to his satisfaction.  I did give him option of trying this at bedside, 

but he didn't want to risk too much pain.





Clinical Quality Measures


DVT/VTE Risk/Contraindication:


Risk Factor Score Per Nursing:  3


RFS Level Per Nursing on Admit:  3=High











AUGUST HOPE DO               Jul 10, 2019 17:03

## 2019-07-10 NOTE — DIAGNOSTIC IMAGING REPORT
INDICATION: Right arm cellulitis and right elbow swelling.



FINDINGS: Sonographic interrogation of the area of swelling in

the right arm was performed. There is edema identified in the

subcutaneous tissues. There is a small complex fluid collection

just deep to the skin wound measuring 2.2 x 0.6 x 1.1 cm. No

internal vascularity is seen.



IMPRESSION: Subcutaneous edema as well as small complex fluid

collection just deep to the wound, as described. This may

represent a small abscess or hematoma.



Dictated by: 



  Dictated on workstation # [id number]

## 2019-07-10 NOTE — HISTORY & PHYSICAL-HOSPITALIST
History of Present Illness


HPI/Chief Complaint


CC: Right arm cellulitis 





HPI: This is a 39yoWM who presented to the ER with severe right arm swelling 

with redness and cellulitis. He thought that there was something in a puncture 

wound and he tried to explore it on his own at home and this happened three days

ago and the right arm has begun to have an increase in swelling and erythema. He

had a fever of 100.4 on admission and white count is 15,000. He was given a Tet

anus update and Dr. Wilson has been consulted incase abscess formation occurs. 

He does have a history of working at [employer] in the [employer] and [employer] 

department for ten years but has been disabled for a couple of years after 

breaking both legs requiring him to quit working. He does smoke, doesn't drink a

lot of alcohol and is in agreement for the IV antibiotics and Dr. Wilson 

consultation.


Source:  patient


Exam Limitations:  no limitations


Date Seen


7/10/19


Time Seen by a Provider:  11:30


Attending Physician


Gela Gentile DO


PCP





Referring Physician





Date of Admission


Jul 10, 2019 at 10:35





Home Medications & Allergies


Home Medications


Reviewed patient Home Medication Reconciliation performed by pharmacy medication

reconciliations technician and/or nursing.


Patients Allergies have been reviewed.





Allergies





Allergies


Coded Allergies


  meperidine (Unverified Allergy, Unknown, PT HAS RECEIVED FENTANYL IN THE PAST 

W/O ISSUE, 2/16/15)








Past Medical-Social-Family Hx


Past Med/Social Hx:  Reviewed Nursing Past Med/Soc Hx, Reviewed and Corrections 

made


Patient Social History


Marrital Status:  cohabiting


Alcohol Use:  Denies Use


Recreational Drug Use:  No


Smoking Status:  Current Everyday Smoker


Type Used:  Cigarettes


Recent Foreign Travel:  No


Contact w/other who traveled:  No


Recent Infectious Disease Expo:  No





Past Medical History


Surgeries:  Appendectomy, Orthopedic


Reproductive:  No


Sexually Transmitted Disease:  No


Musculoskeletal:  Fibromyalgia


History of Blood Disorders:  No





Family History


Reviewed Nursing Family Hx





Review of Systems


Constitutional:  see HPI, fever


EENTM:  no symptoms reported


Respiratory:  no symptoms reported


Cardiovascular:  no symptoms reported


Gastrointestinal:  no symptoms reported


Genitourinary:  no symptoms reported


Musculoskeletal:  joint pain (right elbow)


Skin:  no symptoms reported


Psychiatric/Neurological:  No Symptoms Reported





Physical Exam


Physical Exam


Vital Signs





Vital Signs - First Documented








 7/10/19





 08:09


 


Temp 100.2


 


Pulse 113


 


Resp 20


 


B/P (MAP) 140/91 (107)


 


Pulse Ox 97


 


O2 Delivery Room Air





Capillary Refill : Less Than 3 Seconds


Height, Weight, BMI


Height: 6'3.00"


Weight: 240lbs. oz. 108.916553ex;  BMI


Method:Stated


General Appearance:  No Apparent Distress, WD/WN, Chronically ill


Eyes:  Right Eye Normal Inspection, Right Eye PERRL


HEENT:  PERRL/EOMI, Normal ENT Inspection, Pharynx Normal, Moist Mucous 

Membranes


Neck:  Full Range of Motion, Normal Inspection, Non Tender


Respiratory:  Chest Non Tender, Lungs Clear, Normal Breath Sounds, No Accessory 

Muscle Use, No Respiratory Distress


Cardiovascular:  Regular Rate, Rhythm, No Edema, No Gallop, No JVD, No Murmur, 

Normal Peripheral Pulses


Gastrointestinal:  Normal Bowel Sounds, No Organomegaly, No Pulsatile Mass, Non 

Tender, Soft


Back:  Normal Inspection, No CVA Tenderness, No Vertebral Tenderness


Extremity:  Normal Capillary Refill, Normal Inspection, Normal Range of Motion, 

Non Tender, No Calf Tenderness, No Pedal Edema, Other (right elbow region with 

erythem and elbow pain to palpation)


Neurologic/Psychiatric:  Alert, Oriented x3, No Motor/Sensory Deficits, Normal 

Mood/Affect


Skin:  Normal Color, Warm/Dry


Lymphatic:  No Adenopathy





Results


Results/Procedures


Labs


Laboratory Tests


7/10/19 08:49








Patient resulted labs reviewed.





Assessment/Plan


Admission Diagnosis


Assessment:


Right arm cellulitis


Smoker


Previous orthopedic leg surgeries





Plan:


Abx


Dr Wilson consultation is appreciated


Admission Status:  Inpatient Order (span 2 midnights)


Reason for Inpatient Admission:  


Severe cellulits with sepsis and elevated wbc





Diagnosis/Problems


Diagnosis/Problems





(1) Right arm cellulitis


Status:  Acute


(2) Fever


Status:  Acute


Qualifiers:  


   Fever type:  unspecified  Qualified Codes:  R50.9 - Fever, unspecified


(3) Smoker


Status:  Chronic


(4) Leukocytosis


Status:  Acute


Qualifiers:  


   Leukocytosis type:  leukemoid reaction  Qualified Codes:  D72.823 - Leukemoid

reaction











GELA GENTILE DO                Jul 10, 2019 11:33

## 2019-07-11 VITALS — DIASTOLIC BLOOD PRESSURE: 63 MMHG | SYSTOLIC BLOOD PRESSURE: 132 MMHG

## 2019-07-11 VITALS — SYSTOLIC BLOOD PRESSURE: 103 MMHG | DIASTOLIC BLOOD PRESSURE: 71 MMHG

## 2019-07-11 VITALS — DIASTOLIC BLOOD PRESSURE: 68 MMHG | SYSTOLIC BLOOD PRESSURE: 130 MMHG

## 2019-07-11 VITALS — SYSTOLIC BLOOD PRESSURE: 106 MMHG | DIASTOLIC BLOOD PRESSURE: 66 MMHG

## 2019-07-11 VITALS — SYSTOLIC BLOOD PRESSURE: 113 MMHG | DIASTOLIC BLOOD PRESSURE: 89 MMHG

## 2019-07-11 VITALS — DIASTOLIC BLOOD PRESSURE: 80 MMHG | SYSTOLIC BLOOD PRESSURE: 109 MMHG

## 2019-07-11 VITALS — DIASTOLIC BLOOD PRESSURE: 70 MMHG | SYSTOLIC BLOOD PRESSURE: 115 MMHG

## 2019-07-11 VITALS — DIASTOLIC BLOOD PRESSURE: 75 MMHG | SYSTOLIC BLOOD PRESSURE: 114 MMHG

## 2019-07-11 VITALS — SYSTOLIC BLOOD PRESSURE: 112 MMHG | DIASTOLIC BLOOD PRESSURE: 74 MMHG

## 2019-07-11 VITALS — DIASTOLIC BLOOD PRESSURE: 78 MMHG | SYSTOLIC BLOOD PRESSURE: 126 MMHG

## 2019-07-11 VITALS — DIASTOLIC BLOOD PRESSURE: 80 MMHG | SYSTOLIC BLOOD PRESSURE: 116 MMHG

## 2019-07-11 VITALS — SYSTOLIC BLOOD PRESSURE: 121 MMHG | DIASTOLIC BLOOD PRESSURE: 75 MMHG

## 2019-07-11 VITALS — SYSTOLIC BLOOD PRESSURE: 98 MMHG | DIASTOLIC BLOOD PRESSURE: 61 MMHG

## 2019-07-11 LAB
BASOPHILS # BLD AUTO: 0.1 10^3/UL (ref 0–0.1)
BASOPHILS NFR BLD AUTO: 0 % (ref 0–10)
BUN/CREAT SERPL: 16
CALCIUM SERPL-MCNC: 8.6 MG/DL (ref 8.5–10.1)
CHLORIDE SERPL-SCNC: 109 MMOL/L (ref 98–107)
CO2 SERPL-SCNC: 22 MMOL/L (ref 21–32)
CREAT SERPL-MCNC: 0.79 MG/DL (ref 0.6–1.3)
EOSINOPHIL # BLD AUTO: 0.4 10^3/UL (ref 0–0.3)
EOSINOPHIL NFR BLD AUTO: 3 % (ref 0–10)
ERYTHROCYTE [DISTWIDTH] IN BLOOD BY AUTOMATED COUNT: 13.1 % (ref 10–14.5)
GFR SERPLBLD BASED ON 1.73 SQ M-ARVRAT: > 60 ML/MIN
GLUCOSE SERPL-MCNC: 91 MG/DL (ref 70–105)
HCT VFR BLD CALC: 37 % (ref 40–54)
HGB BLD-MCNC: 12.3 G/DL (ref 13.3–17.7)
LYMPHOCYTES # BLD AUTO: 3.4 X 10^3 (ref 1–4)
LYMPHOCYTES NFR BLD AUTO: 30 % (ref 12–44)
MANUAL DIFFERENTIAL PERFORMED BLD QL: NO
MCH RBC QN AUTO: 30 PG (ref 25–34)
MCHC RBC AUTO-ENTMCNC: 34 G/DL (ref 32–36)
MCV RBC AUTO: 89 FL (ref 80–99)
MONOCYTES # BLD AUTO: 1.3 X 10^3 (ref 0–1)
MONOCYTES NFR BLD AUTO: 11 % (ref 0–12)
NEUTROPHILS # BLD AUTO: 6.3 X 10^3 (ref 1.8–7.8)
NEUTROPHILS NFR BLD AUTO: 56 % (ref 42–75)
PLATELET # BLD: 296 10^3/UL (ref 130–400)
PMV BLD AUTO: 9.6 FL (ref 7.4–10.4)
POTASSIUM SERPL-SCNC: 4.3 MMOL/L (ref 3.6–5)
SODIUM SERPL-SCNC: 139 MMOL/L (ref 135–145)
WBC # BLD AUTO: 11.4 10^3/UL (ref 4.3–11)

## 2019-07-11 PROCEDURE — 0J9D0ZZ DRAINAGE OF RIGHT UPPER ARM SUBCUTANEOUS TISSUE AND FASCIA, OPEN APPROACH: ICD-10-PCS | Performed by: SURGERY

## 2019-07-11 RX ADMIN — SODIUM CHLORIDE SCH MLS/HR: 900 INJECTION INTRAVENOUS at 09:26

## 2019-07-11 RX ADMIN — KETOROLAC TROMETHAMINE PRN MG: 30 INJECTION, SOLUTION INTRAMUSCULAR; INTRAVENOUS at 04:33

## 2019-07-11 RX ADMIN — VANCOMYCIN HYDROCHLORIDE SCH MLS/HR: 500 INJECTION, POWDER, LYOPHILIZED, FOR SOLUTION INTRAVENOUS at 00:08

## 2019-07-11 RX ADMIN — KETOROLAC TROMETHAMINE PRN MG: 30 INJECTION, SOLUTION INTRAMUSCULAR; INTRAVENOUS at 11:55

## 2019-07-11 RX ADMIN — DOCUSATE SODIUM AND SENNOSIDES SCH EA: 8.6; 5 TABLET, FILM COATED ORAL at 21:12

## 2019-07-11 RX ADMIN — KETOROLAC TROMETHAMINE PRN MG: 30 INJECTION, SOLUTION INTRAMUSCULAR; INTRAVENOUS at 12:40

## 2019-07-11 RX ADMIN — VANCOMYCIN HYDROCHLORIDE SCH MLS/HR: 500 INJECTION, POWDER, LYOPHILIZED, FOR SOLUTION INTRAVENOUS at 12:45

## 2019-07-11 RX ADMIN — KETOROLAC TROMETHAMINE PRN MG: 30 INJECTION, SOLUTION INTRAMUSCULAR; INTRAVENOUS at 21:21

## 2019-07-11 NOTE — OPERATIVE REPORT
DATE OF SERVICE:  



PREOPERATIVE DIAGNOSIS:

Right elbow abscess.



POSTOPERATIVE DIAGNOSIS:

Right elbow abscess.



PROCEDURE:

Incision and drainage with packing of right elbow abscess.



SURGEON:

Leandro CRAWLEY ASSIST:

None.



ANESTHESIA:

General endotracheal tube.



SPECIMEN:

Fluid culture.



BLOOD LOSS:

Scant.



FLUIDS:

Per anesthesia.



POSTOPERATIVE CONDITION:

Stable.



INDICATION FOR PROCEDURE:

The patient is a 39-year-old male who is having had a cellulitis in his arm and

had abscess over the elbow, needed to get this drained to get the purulence out.



FINDINGS:

The patient had some purulence around the elbow, removed with I and D.



PROCEDURE NOTE:

After informed consent was obtained, the patient was brought to the operating

room, placed on the table in supine position, sterilely prepped and draped in

normal fashion.  Then positioned the right arm across the chest and made an 
incision

with #11 blade, carried down through the skin and subcutaneous tissue, basically

right over the elbow, immediately got out some purulent fluid, squeezed some

more out and then roughly debrided this area with 4 x 4 and then copiously

irrigated with normal saline and then packed with quarter inch iodoform packing.

The area was then cleaned and dried.  A 4 x 4s and Kerlix dressing placed.  The

patient tolerated the procedure.  He is still in the OR while dictating this. 

He will be sent to the recovery room.  Sponge, instrument and needle count

correct at the end of the case.





Job ID: [id number]

DocumentID: [id number]

Dictated Date:  07/11/2019 11:13:11

Transcription Date: 07/11/2019 16:12:37

Dictated By: LEANDRO HOPE DO

[facility]

## 2019-07-11 NOTE — PROGRESS NOTE - HOSPITALIST
Subjective


HPI/CC On Admission


Date Seen by Provider:  Jul 11, 2019


Time Seen by Provider:  09:30


CC: Right arm cellulitis 





HPI: This is a 39yoWM who presented to the ER with severe right arm swelling 

with redness and cellulitis. He thought that there was something in a puncture 

wound and he tried to explore it on his own at home and this happened three days

ago and the right arm has begun to have an increase in swelling and erythema. He

had a fever of 100.4 on admission and white count is 15,000. He was given a 

Tetanus update and Dr. Wilson has been consulted incase abscess formation 

occurs. He does have a history of working at [employer] in the [employer] 

department for ten years but has been disabled for a couple of years after 

breaking both legs requiring him to quit working. He does smoke, doesn't drink a

lot of alcohol and is in agreement for the IV antibiotics and Dr. Wilson 

consultation.


Subjective/Events-last exam


Pt likely requiring I&D incision and drainage today 


NPO for the procedure 


Right arm is much improved with erythema and decreased pain 


Low grade fever last night of 99.0 


Has no new complaints





Review of Systems


Musculoskeletal:  arm pain





Focused Exam


Lactate Level


7/10/19 08:49: Lactic Acid Level 0.79








Objective


Exam


Vital Signs





Vital Signs








  Date Time  Temp Pulse Resp B/P (MAP) Pulse Ox O2 Delivery O2 Flow Rate FiO2


 


7/11/19 16:54 97.7 85 20 126/78 (94) 96 Room Air  


 


7/11/19 12:10       3 





Capillary Refill : Less Than 3 Seconds


General Appearance:  No Apparent Distress, WD/WN, Chronically ill


HEENT:  PERRL/EOMI, Normal ENT Inspection, Pharynx Normal, Moist Mucous 

Membranes


Neck:  Full Range of Motion, Normal Inspection, Non Tender


Respiratory:  Chest Non Tender, Lungs Clear, Normal Breath Sounds, No Accessory 

Muscle Use, No Respiratory Distress


Cardiovascular:  Regular Rate, Rhythm, No Edema, No Gallop, No JVD, No Murmur, 

Normal Peripheral Pulses


Gastrointestinal:  Normal Bowel Sounds, No Organomegaly, No Pulsatile Mass, Non 

Tender, Soft


Back:  Normal Inspection, No CVA Tenderness, No Vertebral Tenderness


Extremity:  Normal Capillary Refill, Normal Inspection, Normal Range of Motion, 

Non Tender, No Calf Tenderness, No Pedal Edema, Other (right elbow region with 

erythem and elbow pain to palpation)


Neurologic/Psychiatric:  Alert, Oriented x3, No Motor/Sensory Deficits, Normal 

Mood/Affect


Skin:  Normal Color, Warm/Dry


Lymphatic:  No Adenopathy





Results/Procedures


Lab


Laboratory Tests


7/11/19 06:10








Patient resulted labs reviewed.





Assessment/Plan


Assessment and Plan


Assess & Plan/Chief Complaint


Assessment:


Right elbow abscess with cellulitis s/p I&D per Dr Wilson POD # 0


Smoker





Plan:


Monitor labs


Abx


Monitor pain





Diagnosis/Problems


Diagnosis/Problems





(1) Right arm cellulitis


Status:  Acute


(2) Fever


Status:  Acute


Qualifiers:  


   Fever type:  unspecified  Qualified Codes:  R50.9 - Fever, unspecified


(3) Smoker


Status:  Chronic


(4) Leukocytosis


Status:  Acute


Qualifiers:  


   Leukocytosis type:  leukemoid reaction  Qualified Codes:  D72.823 - Leukemoid

reaction





Clinical Quality Measures


DVT/VTE Risk/Contraindication:


Risk Factor Score Per Nursing:  3


RFS Level Per Nursing on Admit:  3=High











LOW OHNEYCUTT DO                Jul 11, 2019 09:52

## 2019-07-11 NOTE — PROGRESS NOTE-POST OPERATIVE
Post-Operative Progess Note


Surgeon (s)/Assistant (s)


Surgeon


AUGUST HOPE DO


Assistant:  none





Pre-Operative Diagnosis


Right elbow abscess





Post-Operative Diagnosis





same





Procedure & Operative Findings


Date of Procedure


7/11/19


Procedure Performed/Findings


I&D with packing of right elbow abscess


Anesthesia Type


GET





Estimated Blood Loss


Estimated blood loss (mL):  scant





Specimens/Packing


Specimens Removed


fluid culture











AUGUST HOPE DO               Jul 11, 2019 11:15

## 2019-07-12 VITALS — DIASTOLIC BLOOD PRESSURE: 87 MMHG | SYSTOLIC BLOOD PRESSURE: 104 MMHG

## 2019-07-12 VITALS — SYSTOLIC BLOOD PRESSURE: 120 MMHG | DIASTOLIC BLOOD PRESSURE: 57 MMHG

## 2019-07-12 VITALS — DIASTOLIC BLOOD PRESSURE: 57 MMHG | SYSTOLIC BLOOD PRESSURE: 123 MMHG

## 2019-07-12 VITALS — SYSTOLIC BLOOD PRESSURE: 121 MMHG | DIASTOLIC BLOOD PRESSURE: 64 MMHG

## 2019-07-12 VITALS — DIASTOLIC BLOOD PRESSURE: 57 MMHG | SYSTOLIC BLOOD PRESSURE: 113 MMHG

## 2019-07-12 LAB
ALBUMIN SERPL-MCNC: 3.7 GM/DL (ref 3.2–4.5)
ALP SERPL-CCNC: 73 U/L (ref 40–136)
ALT SERPL-CCNC: 38 U/L (ref 0–55)
BASOPHILS # BLD AUTO: 0 10^3/UL (ref 0–0.1)
BASOPHILS NFR BLD AUTO: 0 % (ref 0–10)
BILIRUB SERPL-MCNC: 0.2 MG/DL (ref 0.1–1)
BUN/CREAT SERPL: 13
CALCIUM SERPL-MCNC: 9 MG/DL (ref 8.5–10.1)
CHLORIDE SERPL-SCNC: 105 MMOL/L (ref 98–107)
CO2 SERPL-SCNC: 21 MMOL/L (ref 21–32)
CREAT SERPL-MCNC: 0.85 MG/DL (ref 0.6–1.3)
EOSINOPHIL # BLD AUTO: 0 10^3/UL (ref 0–0.3)
EOSINOPHIL NFR BLD AUTO: 0 % (ref 0–10)
ERYTHROCYTE [DISTWIDTH] IN BLOOD BY AUTOMATED COUNT: 13.2 % (ref 10–14.5)
GFR SERPLBLD BASED ON 1.73 SQ M-ARVRAT: > 60 ML/MIN
GLUCOSE SERPL-MCNC: 215 MG/DL (ref 70–105)
HCT VFR BLD CALC: 36 % (ref 40–54)
HGB BLD-MCNC: 12.1 G/DL (ref 13.3–17.7)
LYMPHOCYTES # BLD AUTO: 2 X 10^3 (ref 1–4)
LYMPHOCYTES NFR BLD AUTO: 14 % (ref 12–44)
MANUAL DIFFERENTIAL PERFORMED BLD QL: NO
MCH RBC QN AUTO: 29 PG (ref 25–34)
MCHC RBC AUTO-ENTMCNC: 33 G/DL (ref 32–36)
MCV RBC AUTO: 88 FL (ref 80–99)
MONOCYTES # BLD AUTO: 1.1 X 10^3 (ref 0–1)
MONOCYTES NFR BLD AUTO: 8 % (ref 0–12)
NEUTROPHILS # BLD AUTO: 10.8 X 10^3 (ref 1.8–7.8)
NEUTROPHILS NFR BLD AUTO: 78 % (ref 42–75)
PLATELET # BLD: 367 10^3/UL (ref 130–400)
PMV BLD AUTO: 9.5 FL (ref 7.4–10.4)
POTASSIUM SERPL-SCNC: 4.1 MMOL/L (ref 3.6–5)
PROT SERPL-MCNC: 6.7 GM/DL (ref 6.4–8.2)
SODIUM SERPL-SCNC: 138 MMOL/L (ref 135–145)
WBC # BLD AUTO: 13.8 10^3/UL (ref 4.3–11)

## 2019-07-12 RX ADMIN — HYDROCODONE BITARTRATE AND ACETAMINOPHEN PRN TAB: 5; 325 TABLET ORAL at 00:39

## 2019-07-12 RX ADMIN — VANCOMYCIN HYDROCHLORIDE SCH MLS/HR: 500 INJECTION, POWDER, LYOPHILIZED, FOR SOLUTION INTRAVENOUS at 12:02

## 2019-07-12 RX ADMIN — HYDROCODONE BITARTRATE AND ACETAMINOPHEN PRN TAB: 5; 325 TABLET ORAL at 05:51

## 2019-07-12 RX ADMIN — HYDROCODONE BITARTRATE AND ACETAMINOPHEN PRN TAB: 5; 325 TABLET ORAL at 15:01

## 2019-07-12 RX ADMIN — HYDROCODONE BITARTRATE AND ACETAMINOPHEN PRN TAB: 5; 325 TABLET ORAL at 10:59

## 2019-07-12 RX ADMIN — DOCUSATE SODIUM AND SENNOSIDES SCH EA: 8.6; 5 TABLET, FILM COATED ORAL at 08:24

## 2019-07-12 RX ADMIN — SODIUM CHLORIDE SCH MLS/HR: 900 INJECTION INTRAVENOUS at 08:24

## 2019-07-12 RX ADMIN — VANCOMYCIN HYDROCHLORIDE SCH MLS/HR: 500 INJECTION, POWDER, LYOPHILIZED, FOR SOLUTION INTRAVENOUS at 00:24

## 2019-07-12 NOTE — PROGRESS NOTE - SURGERY
Subjective


Date Seen by a Provider:  Jul 12, 2019


Time Seen by a Provider:  08:36


Subjective/Events-last exam


patient states right arm still with pain.  About the same as yesterday.  Status 

post right elbow incision and drainage with packing.


No new  changes.  Denies any nausea vomiting fever sweats chills shortness of 

breath or chest pain.





Focused Exam


Lactate Level


7/10/19 08:49: Lactic Acid Level 0.79





Objective


Exam





Vital Signs








  Date Time  Temp Pulse Resp B/P (MAP) Pulse Ox O2 Delivery O2 Flow Rate FiO2


 


7/12/19 12:00 98.0 90 18 121/64 (83) 96 Room Air  


 


7/12/19 08:00 97.6 86 18 123/57 (79) 95 Room Air  


 


7/12/19 08:00      Room Air  


 


7/12/19 04:00 98.5 95 24 120/57 (78) 95 Room Air  


 


7/12/19 04:00 98.5 80 22 113/57 (75) 96 Room Air  


 


7/12/19 00:00 98.2 95 24 120/57 (78) 95 Room Air  


 


7/11/19 20:32 97.4 102 20 112/74 (87) 97 Room Air  


 


7/11/19 20:00      Room Air  


 


7/11/19 16:54 97.7 85 20 126/78 (94) 96 Room Air  














I & O 


 


 7/12/19





 07:00


 


Intake Total 2780 ml


 


Output Total 450 ml


 


Balance 2330 ml





Capillary Refill : Less Than 3 Seconds


General Appearance:  No Apparent Distress, WD/WN


HEENT:  PERRL/EOMI, Normal ENT Inspection, Pharynx Normal, Moist Mucous 

Membranes


Neck:  Full Range of Motion, Normal Inspection, Non Tender


Respiratory:  Chest Non Tender, No Accessory Muscle Use, No Respiratory Distress


Cardiovascular:  Regular Rate, Rhythm, No Edema, No Gallop, No JVD, No Murmur, 

Normal Peripheral Pulses


Gastrointestinal:  normal bowel sounds, non tender, soft, no organomegaly, no 

pulsatile mass


Extremity:  Normal Capillary Refill, Normal Inspection, Normal Range of Motion, 

Non Tender, No Calf Tenderness, No Pedal Edema, Other (right elbow region with 

minimal erythema and elbow pain to palpation)


Neurologic/Psychiatric:  Alert, Oriented x3, No Motor/Sensory Deficits, Normal 

Mood/Affect


Skin:  Warm/Dry, Other (very minimal erythema right elbow)


Lymphatic:  No Adenopathy





Results


Lab


Laboratory Tests


7/12/19 05:45: 


White Blood Count 13.8H, Red Blood Count 4.12L, Hemoglobin 12.1L, Hematocrit 36L

, Mean Corpuscular Volume 88, Mean Corpuscular Hemoglobin 29, Mean Corpuscular 

Hemoglobin Concent 33, Red Cell Distribution Width 13.2, Platelet Count 367, 

Mean Platelet Volume 9.5, Neutrophils (%) (Auto) 78H, Lymphocytes (%) (Auto) 14,

Monocytes (%) (Auto) 8, Eosinophils (%) (Auto) 0, Basophils (%) (Auto) 0, 

Neutrophils # (Auto) 10.8H, Lymphocytes # (Auto) 2.0, Monocytes # (Auto) 1.1H, 

Eosinophils # (Auto) 0.0, Basophils # (Auto) 0.0, Sodium Level 138, Potassium 

Level 4.1, Chloride Level 105, Carbon Dioxide Level 21, Anion Gap 12, Blood Urea

Nitrogen 11, Creatinine 0.85, Estimat Glomerular Filtration Rate > 60, 

BUN/Creatinine Ratio 13, Glucose Level 215H, Calcium Level 9.0, Corrected 

Calcium 9.2, Total Bilirubin 0.2, Aspartate Amino Transf (AST/SGOT) 14, Alanine 

Aminotransferase (ALT/SGPT) 38, Alkaline Phosphatase 73, Total Protein 6.7, 

Albumin 3.7


7/12/19 10:55: Vancomycin Level Trough 8.0L





Microbiology


7/10/19 Blood Culture - Preliminary, Resulted


          No growth


7/10/19 MRSA Screen - Final, Complete


          MRSA not isolated


7/11/19 Gram Stain - Final, Resulted


          


7/11/19 Anaerobic Culture, Resulted


          Pending


7/11/19 Surgical Culture - Preliminary, Resulted


          Staphylococcus aureus





Assessment/Plan


Right Arm Cellulitis and Abscess at Elbow





status post incision and drainage


Continue wound care and packing wound


Okay to SC home from surgical standpoint.





Clinical Quality Measures


DVT/VTE Risk/Contraindication:


Risk Factor Score Per Nursing:  3


RFS Level Per Nursing on Admit:  3=High











FARNAZ MILLER DO              Jul 12, 2019 15:17

## 2019-07-12 NOTE — ANESTHESIA-GENERAL POST-OP
General


Patient Condition


Mental Status/LOC:  Same as Preop


Cardiovascular:  Satisfactory


Nausea/Vomiting:  Absent


Respiratory:  Satisfactory


Pain:  Controlled


Complications:  Absent





Post Op Complications


Complications


None





Follow Up Care/Instructions


Patient Instructions


None needed.





Anesthesia/Patient Condition


Patient Condition


Patient is doing well, no complaints, stable vital signs, no apparent adverse 

anesthesia problems.   


No complications reported per nursing.











TABATHA HEATH CRNA          Jul 12, 2019 13:37

## 2019-07-12 NOTE — NUR
Pt told PCT that he needed a pain shot (Lortab had been given 25 minutes before this), went 
into see what pain level was and patient was sleeping.

## 2019-07-12 NOTE — DISCHARGE SUMMARY
Diagnosis/Chief Complaint


Date of Admission


Jul 10, 2019 at 10:35


Date of Discharge





Discharge Date:  Jul 12, 2019


Admission Diagnosis


Assessment:


Right arm cellulitis


Smoker


Previous orthopedic leg surgeries





Plan:


Abx


Dr Wilson consultation is appreciated


Discharge Diagnosis





(1) Right arm cellulitis


Status:  Acute


(2) Fever


Status:  Resolved


(3) Smoker


Status:  Chronic


(4) Leukocytosis


Status:  Acute





Discharge Summary


Discharge Physical Exam


Allergies:  


Coded Allergies:  


     meperidine (Unverified  Allergy, Unknown, PT HAS RECEIVED FENTANYL IN THE 

PAST W/O ISSUE, 2/16/15)


Vitals & I&Os





Vital Signs








  Date Time  Temp Pulse Resp B/P (MAP) Pulse Ox O2 Delivery O2 Flow Rate FiO2


 


7/12/19 08:00 97.6 86 18 123/57 (79) 95 Room Air  


 


7/11/19 12:10       3 








General Appearance:  No Apparent Distress, WD/WN


Skin:  Other (Resolved erythema of the right arm dressing intact)


Neurologic/Psychiatric:  Alert, Oriented x3, No Motor/Sensory Deficits, Normal 

Mood/Affect





Hospital Course


Was the Problem List Reviewed?:  Yes


Hospital course: Patient had a standard hospital course for right arm cellulitis

with elbow abscess requiring incision and drainage by Dr. Wilson on 7/11/19 with

resultant pus removal there was no growth to date on culture.  Patient was 

maintain on broad-spectrum antibiotic coverage of Rocephin and vancomycin 

patient transition to oral Bactrim for an additional 5 days to complete 

treatment and Dr. Wilson evaluated the wound and managed the packing and 

dressing change instructions.


Labs (last 24 hrs)


Laboratory Tests


7/12/19 05:45: 


White Blood Count 13.8H, Red Blood Count 4.12L, Hemoglobin 12.1L, Hematocrit 36L

, Mean Corpuscular Volume 88, Mean Corpuscular Hemoglobin 29, Mean Corpuscular 

Hemoglobin Concent 33, Red Cell Distribution Width 13.2, Platelet Count 367, 

Mean Platelet Volume 9.5, Neutrophils (%) (Auto) 78H, Lymphocytes (%) (Auto) 14,

Monocytes (%) (Auto) 8, Eosinophils (%) (Auto) 0, Basophils (%) (Auto) 0, 

Neutrophils # (Auto) 10.8H, Lymphocytes # (Auto) 2.0, Monocytes # (Auto) 1.1H, 

Eosinophils # (Auto) 0.0, Basophils # (Auto) 0.0, Sodium Level 138, Potassium 

Level 4.1, Chloride Level 105, Carbon Dioxide Level 21, Anion Gap 12, Blood Urea

Nitrogen 11, Creatinine 0.85, Estimat Glomerular Filtration Rate > 60, 

BUN/Creatinine Ratio 13, Glucose Level 215H, Calcium Level 9.0, Corrected 

Calcium 9.2, Total Bilirubin 0.2, Aspartate Amino Transf (AST/SGOT) 14, Alanine 

Aminotransferase (ALT/SGPT) 38, Alkaline Phosphatase 73, Total Protein 6.7, 

Albumin 3.7


7/12/19 10:55: 





Microbiology


7/10/19 Blood Culture - Preliminary, Resulted


          No growth


7/11/19 Gram Stain - Final, Resulted


          


7/11/19 Anaerobic Culture, Resulted


          Pending


7/11/19 Surgical Culture - Preliminary, Resulted


          Staphylococcus species


Patient resulted labs reviewed.


Pending Labs


Laboratory Tests


7/12/19 05:45: 


White Blood Count 13.8, Red Blood Count 4.12, Hemoglobin 12.1, Hematocrit 36, 

Mean Corpuscular Volume 88, Mean Corpuscular Hemoglobin 29, Mean Corpuscular 

Hemoglobin Concent 33, Red Cell Distribution Width 13.2, Platelet Count 367, 

Mean Platelet Volume 9.5, Neutrophils (%) (Auto) 78, Lymphocytes (%) (Auto) 14, 

Monocytes (%) (Auto) 8, Eosinophils (%) (Auto) 0, Basophils (%) (Auto) 0, 

Neutrophils # (Auto) 10.8, Lymphocytes # (Auto) 2.0, Monocytes # (Auto) 1.1, 

Eosinophils # (Auto) 0.0, Basophils # (Auto) 0.0, Sodium Level 138, Potassium 

Level 4.1, Chloride Level 105, Carbon Dioxide Level 21, Anion Gap 12, Blood Urea

Nitrogen 11, Creatinine 0.85, Estimat Glomerular Filtration Rate > 60, 

BUN/Creatinine Ratio 13, Glucose Level 215, Calcium Level 9.0, Corrected Calcium

9.2, Total Bilirubin 0.2, Aspartate Amino Transf (AST/SGOT) 14, Alanine 

Aminotransferase (ALT/SGPT) 38, Alkaline Phosphatase 73, Total Protein 6.7, 

Albumin 3.7


7/12/19 10:55: Vancomycin Level Trough [Pending]








Discussion & Recommendations


Discharge Planning:  <30 minutes discharge planning





Discharge


Home Medications:





Active Scripts


Active


Bactrim Ds Tablet (Sulfamethoxazole/Trimethoprim) 1 Each Tablet 1 Each PO BID


Reported


Aspirin EC (Aspirin) 81 Mg Tablet.dr 81 Mg PO DAILY





Instructions to patient/family


Please see electronic discharge instructions given to patient.





Clinical Quality Measures


DVT/VTE Risk/Contraindication:


Risk Factor Score Per Nursing:  3


RFS Level Per Nursing on Admit:  3=High





Problem Qualifiers





(1) Fever:  


Fever type:  unspecified  Qualified Codes:  R50.9 - Fever, unspecified


(2) Leukocytosis:  


Leukocytosis type:  leukemoid reaction  Qualified Codes:  D72.823 - Leukemoid 

reaction








LOW HONEYCUTT DO                Jul 12, 2019 10:16

## 2019-07-16 NOTE — PHYSICIAN QUERY CLARIFICATION
PQ-Conflicting Diagnosis


Admission/Discharge


Admission Date: Jul 10, 2019 at 10:35 


Discharge Date:  Jul 12, 2019 at 18:00








The medical record reflects the following clinical scenario:





History/Risk Factors:


RT arm cellulitis





Clinical Findings:


T100.2, P 113, R 20, /91, WBC 14.8, Lactic .79, wound Rt elbow gram stain 

- MRSA





Treatment:


IV Ceftriaxone, IV Vancomycin





Question:


Do you agree with the impression of the sepsis per Dr. Dasilva. 


Please document a response in Progress Note or Discharge Summary.





   1. Yes, agree with MRSA sepsis





   2. No, Rt arm cellulitis only





   3. Other, with explanation of clinical findings





   4. Clinically undetermined, no explanation for clinical findings.





PHYSICIAN RESPONSE


Do you agree w/Consulting Dx?:  Yes


Please remember a lack of response to the above will prompt a phone page by 

CDI/Coding staff. 





In responding to this query, please exercise your independent professional 

judgment.  The purpose of this communication is to more accurately reflect the 

complexity of your patients condition. The fact that a question is asked does 

not imply that any particular answer is desired or expected.  





Thank you for your timely response to this clarification.      


   


Requestors name: Tish   





Phone # [phone]








THIS PHYSICIAN QUERY FORM IS A PERMANENT PART OF THE MEDICAL RECORD











TISH ELIAS                 Jul 16, 2019 14:06


LOW HONEYCUTT DO                Jul 16, 2019 15:37

## 2022-08-25 ENCOUNTER — HOSPITAL ENCOUNTER (EMERGENCY)
Dept: HOSPITAL 75 - ER | Age: 42
Discharge: HOME | End: 2022-08-25
Payer: SELF-PAY

## 2022-08-25 DIAGNOSIS — M54.50: Primary | ICD-10-CM

## 2023-05-17 ENCOUNTER — HOSPITAL ENCOUNTER (EMERGENCY)
Dept: HOSPITAL 75 - ER | Age: 43
Discharge: HOME | End: 2023-05-17
Payer: SELF-PAY

## 2023-05-17 VITALS — WEIGHT: 200.62 LBS | BODY MASS INDEX: 26.02 KG/M2 | HEIGHT: 73.62 IN

## 2023-05-17 VITALS — DIASTOLIC BLOOD PRESSURE: 74 MMHG | SYSTOLIC BLOOD PRESSURE: 114 MMHG

## 2023-05-17 DIAGNOSIS — F17.210: ICD-10-CM

## 2023-05-17 DIAGNOSIS — R21: Primary | ICD-10-CM

## 2023-05-17 DIAGNOSIS — Z28.311: ICD-10-CM

## 2023-05-17 PROCEDURE — 99281 EMR DPT VST MAYX REQ PHY/QHP: CPT

## 2023-05-17 NOTE — ED GENERAL
General


Chief Complaint:  Bite-Animal/Human/Insect


Stated Complaint:  SPIDER BITE | FACIAL SWELLING


Nursing Triage Note:  


PT AMB TO TRIAGE PT CO OF SPIDER BITE R WRIST AREA HAS BEEN THERE FOR APPROX 1 


WEEK. PT HAS FACIAL SWELLING ON R SIDE CORNER OF MOUTH RATES PAIN 2/10


Source of Information:  Patient


Exam Limitations:  No Limitations





History of Present Illness


Date Seen by Provider:  May 17, 2023


Time Seen by Provider:  13:27


Initial Comments


Patient is a 43-year-old male who presents ED for left-sided facial swelling and

crusting.  Also reports lesions to his upper extremity.  Initially started on 

his right wrist.  Patient woke up with a potential spider bite.  Noted some 

redness and swelling but the redness and swelling has improved.  Started 

breaking out with a rash with similar type lesions to his upper extremities and 

now his face over the past 2 days.  Denies any drainage, fever, chills, nausea 

vomiting, diarrhea.  Has been using akiko tea with some improvement.  Denies 

any dental pain, headache, dizziness, history of MRSA, chest pain or shortness 

of breath





Allergies and Home Medications


Allergies


Coded Allergies:  


     meperidine (Unverified  Allergy, Unknown, PT HAS RECEIVED FENTANYL IN THE 

PAST W/O ISSUE, 2/16/15)





Patient Home Medication List


Home Medication List Reviewed:  Yes


Aspirin (Aspirin EC) 81 Mg Tablet.dr, 81 MG PO DAILY, (Reported)


   Entered as Reported by: DIAMOND GUSMAN on 7/10/19 1300


Cephalexin (Cephalexin) 500 Mg Tablet, 500 MG PO QID


   Prescribed by: TAWNYA CADENA on 5/17/23 1329


Sulfamethoxazole/Trimethoprim (Bactrim Ds Tablet) 1 Each Tablet, 1 EACH PO BID


   Prescribed by: LOW HONEYCUTT on 7/12/19 1014


Sulfamethoxazole/Trimethoprim (Bactrim Ds Tablet) 800 Mg-160 Mg Tablet, 1 EACH 

PO BID


   Prescribed by: TAWNYA CADENA on 5/17/23 1329





Review of Systems


Review of Systems


Constitutional:  No chills, No diaphoresis, No malaise, No weakness


EENTM:  No ear pain, No blurred vision, No double vision, No mouth pain, No 

mouth swelling


Respiratory:  No cough, No dyspnea on exertion


Cardiovascular:  No chest pain


Gastrointestinal:  No abdominal pain, No nausea, No vomiting


Genitourinary:  No decreased output, No discharge


Musculoskeletal:  No back pain, No joint pain, No joint swelling, No muscle pain


Skin:  change in color





Past Medical-Social-Family Hx


Patient Social History


Tobacco Use?:  Yes


Tobacco type used:  Cigarettes


Smoking Status:  Current Someday Smoker


Use of E-Cig and/or Vaping dev:  No


Substance use?:  No


Alcohol Use?:  No


Pt feels they are or have been:  No





Immunizations Up To Date


First/Initial COVID19 Vaccinat:  YES





Past Medical History


Surgery/Hospitalization HX:  


SURG R ANKLE


Surgeries:  Yes


Appendectomy, Orthopedic


Respiratory:  No


Cardiac:  No


Neurological:  No


Reproductive Disorders:  No


Sexually Transmitted Disease:  No


Gastrointestinal:  No


Musculoskeletal:  Yes (RIGHT ANKLE)


Fibromyalgia


Endocrine:  No


Cancer:  No


Psychosocial:  No


Integumentary:  No


Blood Disorders:  No





Family Medical History


Other Conditions/Hx





Physical Exam


Vital Signs





Vital Signs - First Documented








 5/17/23





 13:15


 


Temp 36.9


 


Pulse 133


 


Resp 18


 


B/P (MAP) 114/74 (87)


 


Pulse Ox 97





Capillary Refill : Less Than 3 Seconds


Height, Weight, BMI


Height: 6'3.00"


Weight: 240lbs. 0.0oz. 108.036647ks; 26.00 BMI


Method:Stated


General Appearance:  No Apparent Distress, WD/WN


Eyes:  Bilateral Eye Normal Inspection, Bilateral Eye PERRL, Bilateral Eye EOMI


HEENT:  PERRL/EOMI, TMs Normal, Normal ENT Inspection, Pharynx Normal


Neck:  Full Range of Motion, Normal Inspection, Non Tender


Respiratory:  Chest Non Tender, Lungs Clear, Normal Breath Sounds, No Accessory 

Muscle Use


Cardiovascular:  Regular Rate, Rhythm, No Edema, No Gallop, No JVD, No Murmur


Gastrointestinal:  Normal Bowel Sounds, No Organomegaly, No Pulsatile Mass, Non 

Tender


Back:  Normal Inspection, No CVA Tenderness, No Vertebral Tenderness


Extremity:  Normal Capillary Refill, Normal Range of Motion, Non Tender


Neurologic/Psychiatric:  Alert, Oriented x3, No Motor/Sensory Deficits, Normal 

Mood/Affect


Skin:  Other (Erythematous crusty lesions to the upper extremity left side of 

face.  No fluctuant mass, active drainage.)





Progress/Results/Core Measures


Suspected Sepsis


SIRS


Temperature: 


Pulse: 133 


Respiratory Rate: 18


 


Blood Pressure 114 /74 


Mean: 87





Results/Orders


Vital Signs/I&O











 5/17/23 5/17/23





 13:15 13:35


 


Temp 36.9 


 


Pulse 133 133


 


Resp 18 18


 


B/P (MAP) 114/74 (87) 114/74


 


Pulse Ox 97 97





Capillary Refill : Less Than 3 Seconds








Blood Pressure Mean:                    87











Departure


Communication (PCP)


Reviewed previous ER visits, H&P, lab testing.  Differential diagnosis of a 

spider bite, bug bite, cellulitis, bacterial infection. concern for bacterial 

skin infection.  Possible spider bite versus staph versus MRSA.  No fever, 

chills.  He states the rash appears to be improving some however he is concerned

that it has spread.  No fluctuant mass need incision and drainage.  Will 

discharge with Keflex and Bactrim to cover MRSA.  Avoid picking at the area.  

Topical Neosporin bacitracin twice a day.  Keep the area clean.  Denies drug use

or alcohol use.  Denies history of diabetes.  If any worsening rash to return 

back to ED.  No oral lesions





Impression





   Primary Impression:  


   Skin rash


Disposition:  01 HOME, SELF-CARE


Condition:  Stable





Departure-Patient Inst.


Decision time for Depature:  13:28


Referrals:  


[facility] [place]





MARCIA,LOCAL PHYSICIAN (PCP)


Primary Care Physician


Patient Instructions:  Skin Rash





Add. Discharge Instructions:  


If worsening rash return back to ED for further evaluation.





All discharge instructions reviewed with patient and/or family. Voiced 

understanding.


Scripts


Cephalexin (Cephalexin) 500 Mg Tablet


500 MG PO QID for 7 Days, #28 TAB


   Prov: GARETH MANUEL         5/17/23 


Sulfamethoxazole/Trimethoprim (Bactrim Ds Tablet) 800 Mg-160 Mg Tablet


1 EACH PO BID for 7 Days, #14 TAB


   Prov: GARETH MANUEL         5/17/23











GARETH MANUEL          May 17, 2023 13:29

## 2023-08-09 ENCOUNTER — HOSPITAL ENCOUNTER (EMERGENCY)
Dept: HOSPITAL 75 - ER | Age: 43
Discharge: HOME | End: 2023-08-09
Payer: SELF-PAY

## 2023-08-09 VITALS — DIASTOLIC BLOOD PRESSURE: 88 MMHG | SYSTOLIC BLOOD PRESSURE: 140 MMHG

## 2023-08-09 VITALS — WEIGHT: 220.46 LBS | HEIGHT: 73.62 IN | BODY MASS INDEX: 28.6 KG/M2

## 2023-08-09 DIAGNOSIS — F17.210: ICD-10-CM

## 2023-08-09 DIAGNOSIS — L03.221: Primary | ICD-10-CM

## 2023-08-09 PROCEDURE — 10060 I&D ABSCESS SIMPLE/SINGLE: CPT

## 2023-08-09 NOTE — ED GENERAL
General


Chief Complaint:  Skin/Wound Problems


Stated Complaint:  BOIL ON NECK


Nursing Triage Note:  


PT AMB TO RM 3 PT CO OF SWELLING AND POSS ABCESS ON BACK OF NECK, STARTED 


YESTERDAY. DENIES DRAINAGE AT THIS X. PT HAS HX OF ABCESS ON NECK. PT CO OF PAIN




AT AREA 3/10


Source of Information:  Patient


Exam Limitations:  No Limitations





History of Present Illness


Date Seen by Provider:  Aug 9, 2023


Time Seen by Provider:  09:38


Initial Comments


43-year-old male with past medical history of MRSA infections in his skin coming

in due to concerns for an infection in the back of his neck.  He noticed some 

swelling yesterday and pain worsening today.  No fever that he knows of.  He 

believes he felt a sting recently, unsure if he was actually stung or bitten.  

He is up-to-date on his tetanus vaccine.  He is otherwise denying any other 

acute complaints including no drainage from it as of yet.





Allergies and Home Medications


Allergies


Coded Allergies:  


     meperidine (Unverified  Allergy, Unknown, PT HAS RECEIVED FENTANYL IN THE 

PAST W/O ISSUE, 2/16/15)





Patient Home Medication List


Home Medication List Reviewed:  Yes


Aspirin (Aspirin EC) 81 Mg Tablet.dr, 81 MG PO DAILY, (Reported)


   Entered as Reported by: DIAMOND GUSMAN on 7/10/19 1300


Cephalexin (Cephalexin) 500 Mg Tablet, 500 MG PO QID


   Prescribed by: TAWNYA CADENA on 5/17/23 1329


Cephalexin (Cephalexin) 500 Mg Tablet, 500 MG PO QID


   Prescribed by: GARETH RODRIGUEZ on 8/9/23 1003


Mupirocin (Mupirocin) 2 % Oin.pf.can, 1 GM TP BID


   Prescribed by: GARETH RODRIGUEZ on 8/9/23 1003


Sulfamethoxazole/Trimethoprim (Bactrim Ds Tablet) 1 Each Tablet, 1 EACH PO BID


   Prescribed by: LOW HONEYCUTT on 7/12/19 1014


Sulfamethoxazole/Trimethoprim (Bactrim Ds Tablet) 800 Mg-160 Mg Tablet, 1 EACH 

PO BID


   Prescribed by: TAWNYA CADENA on 5/17/23 1329


Sulfamethoxazole/Trimethoprim (Bactrim Ds Tablet) 1 Each Tablet, 1 EACH PO BID


   Prescribed by: GARETH RODRIGUEZ on 8/9/23 1003





Review of Systems


Review of Systems


Constitutional:  No fever


EENTM:  no symptoms reported


Respiratory:  no symptoms reported


Cardiovascular:  no symptoms reported


Gastrointestinal:  no symptoms reported


Skin:  see HPI





Past Medical-Social-Family Hx


Patient Social History


Tobacco Use?:  Yes


Tobacco type used:  Cigarettes


Smoking Status:  Current Everyday Smoker


Substance use?:  No


Alcohol Use?:  No


Pt feels they are or have been:  No





Immunizations Up To Date


First/Initial COVID19 Vaccinat:  YES


Second COVID19 Vaccination Rod:  YES


Third COVID19 Vaccination Date:  YES





Past Medical History


Surgery/Hospitalization HX:  


SURG R ANKLE


Surgeries:  Yes


Appendectomy, Orthopedic


Respiratory:  No


Cardiac:  No


Neurological:  No


Reproductive Disorders:  No


Sexually Transmitted Disease:  No


Gastrointestinal:  No


Musculoskeletal:  Yes (RIGHT ANKLE)


Fibromyalgia


Endocrine:  No


Cancer:  No


Psychosocial:  No


Integumentary:  No


Blood Disorders:  No





Family Medical History


Other Conditions/Hx





Physical Exam


Vital Signs





Vital Signs - First Documented








 8/9/23





 09:40


 


Temp 36.1


 


Pulse 125


 


Resp 18


 


B/P (MAP) 145/100 (115)


 


Pulse Ox 97





Capillary Refill : Less Than 3 Seconds


Height, Weight, BMI


Height: 6'3.00"


Weight: 240lbs. 0.0oz. 108.232370zl; 28.00 BMI


Method:Stated


General Appearance:  No Apparent Distress, WD/WN


Eyes:  Bilateral Eye Normal Inspection


HEENT:  PERRL/EOMI, Normal ENT Inspection, Pharynx Normal


Neck:  Full Range of Motion, Supple, Other (Posterior neck along the cervical 

spine around the hairline with area of erythema and induration, no abscess 

palpated)


Respiratory:  Chest Non Tender, Lungs Clear, Normal Breath Sounds, No Accessory 

Muscle Use, No Respiratory Distress


Cardiovascular:  Regular Rate, Rhythm, No Edema, Normal Peripheral Pulses


Gastrointestinal:  Normal Bowel Sounds, Non Tender, Soft; No Distended, No 

Guarding


Back:  Normal Inspection, No CVA Tenderness


Extremity:  Normal Capillary Refill, Normal Inspection, Normal Range of Motion, 

Non Tender, No Calf Tenderness, No Pedal Edema


Neurologic/Psychiatric:  Alert, No Motor/Sensory Deficits, Normal Mood/Affect


Skin:  Normal Color, Warm/Dry, Rash





Procedures/Interventions


I&D :  


   Site:  posterior neck


   I & D Procedure:  betadine prep


Progress


Stab incision with an 18-gauge needle with about half a cc of purulent drainage 

noted followed by blood





Progress/Results/Core Measures


Suspected Sepsis


SIRS


Temperature: 


Pulse: 125 


Respiratory Rate: 18


 


Blood Pressure 145 /100 


Mean: 115





Results/Orders


My Orders





Orders - GARETH RODRIGUEZ MD


Ibuprofen  Tablet (Ibuprofen  Tablet) (8/9/23 10:00)


Sulfamethoxazole/Trimet Ds Tab (Bactrim (8/9/23 10:00)


Cephalexin Capsule (Cephalexin Capsule) (8/9/23 09:54)


Lidocaine/Epi Mpf 2% 1:200,000 (Xylocain (8/9/23 10:15)


Lidocaine/Epi Mpf 2% 1:200,000 (Xylocain (8/9/23 10:06)





Medications Given in ED





Current Medications








 Medications  Dose


 Ordered  Sig/Ugo


 Route  Start Time


 Stop Time Status Last Admin


Dose Admin


 


 Ibuprofen  600 mg  ONCE  ONCE


 PO  8/9/23 10:00


 8/9/23 10:01 DC 8/9/23 10:03


600 MG


 


 Lidocaine/


 Epinephrine  20 ml  ONCE  ONCE


 INJ  8/9/23 10:15


 8/9/23 10:16 DC 8/9/23 10:07


20 ML


 


 Trimethoprim/


 Sulfamethoxazole  1 ea  ONCE  ONCE


 PO  8/9/23 10:00


 8/9/23 10:01 DC 8/9/23 10:03


1 EA








Vital Signs/I&O











 8/9/23





 09:40


 


Temp 36.1


 


Pulse 125


 


Resp 18


 


B/P (MAP) 145/100 (115)


 


Pulse Ox 97





Capillary Refill : Less Than 3 Seconds








Blood Pressure Mean:                    115








Progress Note :  


Progress Note


43-year-old male with above history coming in with obvious cellulitis and 

induration on his posterior neck.  I do not palpate any fluctuance that would be

concerning for abscess.  I did a point-of-care ultrasound showing a punctate 

area of fluid that I will attempt to put a needle into to allow it to drain a 

little easier.  About half a cc drained out of purulent drainage which is about 

the amount that was seen on ultrasound.  I suspect he is in the early stages of 

forming an abscess and this is phlegmon.  I reviewed his history including his 

microbiological reports, and he has grown out MRSA that is sensitive to Bactrim.

 He will be given a dose here followed by prescription.  He is well-appearing, 

afebrile, and does not clinically appear like he needs IV antibiotics and 

admission.





Departure


Impression





   Primary Impression:  


   Phlegmonous cellulitis


Disposition:  01 HOME, SELF-CARE


Condition:  Stable





Departure-Patient Inst.


Decision time for Depature:  10:30


Referrals:  


[facility]/KIKE KENNEDY,LOCAL PHYSICIAN (PCP)


Primary Care Physician


Patient Instructions:  MRSA (DC)





Add. Discharge Instructions:  


We did send antibiotics that cover the bacteria you have grown out in the past. 

Will do a slightly more prolonged course than you have had in the past.  We also

recommend you start taking baths with the chlorhexidine scrub that we have sent 

home with you for the next couple of days.  You could also put mupirocin on your

wounds which was sent as a prescription as well.  Take ibuprofen and/or Tylenol 

as needed for pain.  If it continues to worsen after 2 to 3 days of antibiotics,

we want you to be reevaluated by doctor.





Unfortunately, you have shown that you have MRSA on your skin which is a 

bacteria.  Since you have had many recurrent infections, it can be difficult to 

truly "get rid of it".  We recommend following up with [facility] as well.


Scripts


Mupirocin (Mupirocin) 2 % Oin.pf.can


1 GM TP BID for 10 Days, #1 TUBE


   Prov: GARETH RODRIGUEZ MD         8/9/23 


Cephalexin (Cephalexin) 500 Mg Tablet


500 MG PO QID for 10 Days, #40 TAB


   Prov: GARETH RODRIGUEZ MD         8/9/23 


Sulfamethoxazole/Trimethoprim (Bactrim Ds Tablet) 1 Each Tablet


1 EACH PO BID for 10 Days, #20 TAB


   Prov: GARETH RODRIGUEZ MD         8/9/23


Work/School Note:  Work Release Form   Date Seen in the Emergency Department:  

Aug 10, 2023


   Return to Work:  Aug 10, 2023


   Restrictions:  No Restrictions











GARETH RODRIGUEZ MD           Aug 9, 2023 10:01